# Patient Record
Sex: FEMALE | Race: WHITE | NOT HISPANIC OR LATINO | Employment: OTHER | URBAN - METROPOLITAN AREA
[De-identification: names, ages, dates, MRNs, and addresses within clinical notes are randomized per-mention and may not be internally consistent; named-entity substitution may affect disease eponyms.]

---

## 2017-01-09 ENCOUNTER — HOSPITAL ENCOUNTER (OUTPATIENT)
Dept: PAIN MEDICINE | Facility: HOSPITAL | Age: 42
Discharge: HOME | End: 2017-01-09
Attending: PHYSICAL MEDICINE & REHABILITATION | Admitting: PHYSICAL MEDICINE & REHABILITATION

## 2017-02-13 ENCOUNTER — HOSPITAL ENCOUNTER (OUTPATIENT)
Dept: PAIN MEDICINE | Facility: HOSPITAL | Age: 42
Discharge: HOME | End: 2017-02-13
Attending: PHYSICAL MEDICINE & REHABILITATION | Admitting: PHYSICAL MEDICINE & REHABILITATION

## 2017-04-10 ENCOUNTER — HOSPITAL ENCOUNTER (OUTPATIENT)
Dept: PAIN MEDICINE | Facility: HOSPITAL | Age: 42
Discharge: HOME | End: 2017-04-10
Attending: PHYSICAL MEDICINE & REHABILITATION | Admitting: PHYSICAL MEDICINE & REHABILITATION

## 2017-06-12 ENCOUNTER — HOSPITAL ENCOUNTER (OUTPATIENT)
Dept: PAIN MEDICINE | Facility: HOSPITAL | Age: 42
Discharge: HOME | End: 2017-06-12
Attending: PHYSICAL MEDICINE & REHABILITATION | Admitting: PHYSICAL MEDICINE & REHABILITATION

## 2017-06-12 LAB
AMPHETAMINES UR QL SCN: NEGATIVE
BZE UR QL SCN: NEGATIVE
CREAT 24H UR-MCNC: NORMAL MG/DL
METHADONE UR QL SCN: NEGATIVE
OPIATE CONFIRMATION URINE: NORMAL
THC SERPLBLD CFM-MCNC: NEGATIVE NG/ML

## 2017-08-07 ENCOUNTER — HOSPITAL ENCOUNTER (OUTPATIENT)
Dept: PAIN MEDICINE | Facility: HOSPITAL | Age: 42
Discharge: HOME | End: 2017-08-07
Attending: PHYSICAL MEDICINE & REHABILITATION | Admitting: PHYSICAL MEDICINE & REHABILITATION

## 2017-10-05 ENCOUNTER — HOSPITAL ENCOUNTER (OUTPATIENT)
Dept: PAIN MEDICINE | Facility: HOSPITAL | Age: 42
Discharge: HOME | End: 2017-10-05
Attending: PHYSICAL MEDICINE & REHABILITATION | Admitting: PHYSICAL MEDICINE & REHABILITATION

## 2017-11-28 ENCOUNTER — HOSPITAL ENCOUNTER (OUTPATIENT)
Dept: PAIN MEDICINE | Facility: HOSPITAL | Age: 42
Discharge: HOME | End: 2017-11-28
Attending: PHYSICAL MEDICINE & REHABILITATION | Admitting: PHYSICAL MEDICINE & REHABILITATION

## 2018-01-22 ENCOUNTER — HOSPITAL ENCOUNTER (OUTPATIENT)
Dept: PAIN MEDICINE | Facility: HOSPITAL | Age: 43
Discharge: HOME | End: 2018-01-22
Attending: PHYSICAL MEDICINE & REHABILITATION | Admitting: PHYSICAL MEDICINE & REHABILITATION

## 2018-03-19 ENCOUNTER — HOSPITAL ENCOUNTER (OUTPATIENT)
Dept: PAIN MEDICINE | Facility: HOSPITAL | Age: 43
Discharge: HOME | End: 2018-03-19
Attending: PHYSICAL MEDICINE & REHABILITATION | Admitting: PHYSICAL MEDICINE & REHABILITATION

## 2018-06-05 ENCOUNTER — HOSPITAL ENCOUNTER (OUTPATIENT)
Dept: PAIN MEDICINE | Facility: HOSPITAL | Age: 43
Discharge: HOME | End: 2018-06-05
Attending: PHYSICAL MEDICINE & REHABILITATION | Admitting: PHYSICAL MEDICINE & REHABILITATION

## 2018-08-06 ENCOUNTER — HOSPITAL ENCOUNTER (OUTPATIENT)
Dept: PAIN MEDICINE | Facility: HOSPITAL | Age: 43
Discharge: HOME | End: 2018-08-06
Attending: PHYSICAL MEDICINE & REHABILITATION | Admitting: PHYSICAL MEDICINE & REHABILITATION

## 2018-10-01 ENCOUNTER — HOSPITAL ENCOUNTER (OUTPATIENT)
Dept: PAIN MEDICINE | Facility: HOSPITAL | Age: 43
Discharge: HOME | End: 2018-10-01
Attending: PHYSICAL MEDICINE & REHABILITATION | Admitting: PHYSICAL MEDICINE & REHABILITATION

## 2018-10-01 LAB
AMPHETAMINES UR QL SCN: NEGATIVE
BARBITURATES UR QL SCN: NEGATIVE
BENZODIAZ UR QL SCN: NEGATIVE
BZE UR QL SCN: NEGATIVE
CREAT 24H UR-MCNC: 187.6 MG/DL
METHADONE UR QL SCN: NEGATIVE
OPIATE CONFIRMATION URINE: ABNORMAL
OPIATES TESTED UR SCN: ABNORMAL
PCP UR QL: NEGATIVE
THC SERPLBLD CFM-MCNC: NEGATIVE NG/ML

## 2018-12-04 ENCOUNTER — HOSPITAL ENCOUNTER (OUTPATIENT)
Dept: PAIN MEDICINE | Facility: HOSPITAL | Age: 43
Discharge: HOME | End: 2018-12-04
Attending: PHYSICAL MEDICINE & REHABILITATION | Admitting: PHYSICAL MEDICINE & REHABILITATION

## 2019-02-28 ENCOUNTER — HOSPITAL ENCOUNTER (OUTPATIENT)
Dept: PAIN MEDICINE | Facility: HOSPITAL | Age: 44
Discharge: HOME | End: 2019-02-28
Attending: PHYSICAL MEDICINE & REHABILITATION | Admitting: PHYSICAL MEDICINE & REHABILITATION

## 2019-05-28 ENCOUNTER — CONVERSION ENCOUNTER (OUTPATIENT)
Dept: PAIN MEDICINE | Facility: CLINIC | Age: 44
End: 2019-05-28

## 2019-05-28 ENCOUNTER — HOSPITAL ENCOUNTER (OUTPATIENT)
Dept: PAIN MEDICINE | Facility: HOSPITAL | Age: 44
Discharge: HOME | End: 2019-05-28
Attending: PHYSICAL MEDICINE & REHABILITATION | Admitting: PHYSICAL MEDICINE & REHABILITATION

## 2019-06-04 VITALS
WEIGHT: 202 LBS | SYSTOLIC BLOOD PRESSURE: 121 MMHG | HEART RATE: 89 BPM | DIASTOLIC BLOOD PRESSURE: 82 MMHG | HEIGHT: 62 IN | RESPIRATION RATE: 16 BRPM | OXYGEN SATURATION: 97 % | BODY MASS INDEX: 37.17 KG/M2

## 2019-06-06 NOTE — PROGRESS NOTES
HPI: lower abdominal pain, rectal pain, began  with dz of Ulcerative Colitis, 9/10 at worst, 7/10 at best, 8/10 today, always present, varies with UC, helped only by meds, was taking Tramadol with modest benefit, no NSAIDs with UC, also takes Humira   and other UC meds. X-ray pelvis w/o acute injury. CT head with sinusitis only. Prior notes reviewed, as above, referred for pain management, wants to take minimum pain meds possible. Started Norco 5/325mg QID prn, not effective, increased to 7.5/325mg QID   prn, then 10/325mg QID prn with better relief, generally doing well now except during flares. Had bladder insterstim implanted with improvement in incontinence, no change to pain, stable still. Syncopal episodes, having cardiac cath.    Referring MD: Adela Foster NP  Age: 44 Years Old  Sex: Female    Pain Assessment   Location of Pain: ulcerative colitis pain  Previous Pain Rating : 7  Current Pain Ratin  Last Dose Pain medicine Hydrocodone @ 0300  Have your bowel habits changed since you started taking pain medication? No  Epidural History None      Past Medical History:     Reviewed history from 2018 and no changes required:        Ulcerative colitis        Hyperlipidemia        GERD        difficulty swallowing        Marsh problems    Past Surgical History:     Reviewed history from 2018 and no changes required:        Partial hysterectomy        Colonoscopy        EGD        Cholecystectomy        Pacemaker for bladder Stimulator Dec 21, 2016 Dr. Charles         Right breast biopsy        stress test         Heart Catherization (10/09/2018)    Family History Summary:      Reviewed history Last on 2019 and no changes required:2019  Uncle - Has Family History of Coronary Heart Disease male < 55 -  MI Age 47  - Entered On: 2018  Uncle - Has Family History of Heart Disease -  MI Age 47  - Entered On: 2018  MGF - Has Family History of Coronary Heart  Disease male < 55 -  MI age 52  - Entered On: 2018  MGF - Has Family History of Heart Disease -  MI age 52  - Entered On: 2018      Social History:     Reviewed history from 2018 and no changes required:        Patient currently smokes every day.        Patient has been counseled to quit.        Passive Smoke: Y        Alcohol Use: N        Drug Use: N                        Children~1        Occupation~Disabled        Vital Signs:    Patient Profile:    44 Years Old Female  CC:         Abdominal pain  Height:     62 inches  Weight:     202 pounds  BMI:        36.94     O2 Sat:     97 %  Temp:       98.6 degrees F oral  Pulse rate: 89 / minute  Resp:       16 per minute  BP Sittin / 82    Patient has a risk of falls? No  Patient in pain?    Yes    Problems: Active problems were reviewed with the patient during this visit.  Medications: Medications were reviewed with the patient during this visit.  Allergies: Allergies were reviewed with the patient during this visit.        Vitals Entered By: Izabella Yoon MA (May 28, 2019 7:50 AM)      Risk Factors:     Smoked Tobacco Use:  Current every day smoker     Cigarettes:  Yes -- 1/2 pk pack(s) per day,      Years smoked:  25  Smokeless Tobacco Use:  Never     Counseled to quit/cut down:  yes  Passive smoke exposure:  yes  Drug use:  no  Caffeine use:  2 drinks per day  Alcohol use:  no  Exercise:  no  Seatbelt use:  100 %  Sun Exposure:  frequently    Family History Risk Factors:     Family History of MI in females < 65 years old:  no     Family History of MI in males < 55 years old:  yes    Previous Tobacco Use: Signed On - 2019  Smoked Tobacco Use:  Current every day smoker     Cigarettes:  Yes -- 1/2 pk pack(s) per day,      Years smoked:  25  Smokeless Tobacco Use:  Never     Counseled to quit/cut down:  yes  Passive smoke exposure:  yes  Drug use:  no  Caffeine use:  2 drinks per day        Review of Systems         See HPI    General       Complains of fatigue.       Denies fever and chills.    Eyes       Denies vision loss - both eyes.    ENT       Complains of difficulty swallowing.       Denies decreased hearing.    CV       Denies chest pain or discomfort.    Resp       Complains of shortness of breath.    GI       Complains of nausea, vomiting, abdominal pain, diarrhea and constipation.       Denies stool incontinence.           Complains of inability to control bladder.    MS       Complains of back pain.       Denies joint pain, joint swelling and neck pain.    Derm       Denies rash.    Neuro       Complains of headaches and numbness.       Denies weakness and dizziness.      Physical Exam   General  General appearance: well developed, well nourished, no acute distress  Communication ability: communicates by voice, normal quality  Gait and station: Normal    Mental Status Exam   Mental Status: AAO x3  Thought Content: Intact  Behavior: Appropriate    Head and Face   Inspection: normocephalic, no scars, lesions, or masses  Facial strength: no weakness    Respiratory   Auscultation: clear to auscultation bilaterally, no rales, rhonchi, or wheezes    Cardiovascular   Auscultation: RRR, no murmur, rub, or gallop    Abdomen   Abdomen: soft, non-tender, non-distended, no masses, bowel sounds normal    Extremities   Pedal pulses: pulses 2+, symmetric  Periph. circulation: no cyanosis, clubbing, edema, or varicosities      EXAM:     Neuro   R Leg 1+  L Leg 1+    Strength: Legs Normal  Sensation: SILT BLE      Total Score Risk Category   Low Risk 0 - 3   Moderate Risk 4 - 7   High Risk > 8     Assessment   Status of Existing Problems:  Assessed Abdominal pain as unchanged - Raul Horowitz MD  Assessed Chronic pain syndrome as unchanged - Raul Horowitz MD    Plan   New Prescriptions/Refills:  NORCO  MG ORAL TABLET (HYDROCODONE-ACETAMINOPHEN) Take 1 tab PO q6h prn pain. DX:R10.9. DNF until 8/5/19.  #120 x 0,   05/28/2019, Raul Horowitz MD  NORCO  MG ORAL TABLET (HYDROCODONE-ACETAMINOPHEN) Take 1 tab PO q6h prn pain. DX:R10.9. DNF until 7/6/19.  #120 x 0,  05/28/2019, Raul Horowitz MD  NORCO  MG ORAL TABLET (HYDROCODONE-ACETAMINOPHEN) Take 1 tab PO q6h prn pain. DX:R10.9. DNF until 6/6/19.  #120 x 0,  05/28/2019, Raul Horowitz MD    Updated Medication List:   NORCO  MG ORAL TABLET (HYDROCODONE-ACETAMINOPHEN) Take 1 tab PO q6h prn pain. DX:R10.9. DNF until 8/5/19.  HYDROCODONE-ACETAMINOPHEN  MG ORAL TABLET (HYDROCODONE-ACETAMINOPHEN) Take 1 tab PO q6h prn pain. DX:R10.9. DNF until 5/6/19.  HYDROCODONE-ACETAMINOPHEN  MG ORAL TABLET (HYDROCODONE-ACETAMINOPHEN) Take 1 tab PO q6h prn pain. DX:R10.9. DNF until 4/6/19.  IMITREX STATDOSE REFILL 6 MG/0.5ML SUBCUTANEOUS SOLUTION CARTRIDGE (SUMATRIPTAN SUCCINATE) Inject SQ with onset of mirgraine  RANEXA  MG TABLET (RANOLAZINE) TAKE 1 TABLET BY MOUTH TWICE A DAY  MELOXICAM 15 MG ORAL TABLET (MELOXICAM) p.o. qd  LYRICA 25 MG ORAL CAPSULE (PREGABALIN) BID  OMEPRAZOLE 40 MG ORAL CAPSULE DELAYED RELEASE (OMEPRAZOLE) 2 times a day  RIZATRIPTAN BENZOATE 10 MG ORAL TABLET (RIZATRIPTAN BENZOATE) 1 tablet with onset of migraine, repeat at 2 hours  TOPAMAX TABLET (TOPIRAMATE TABS) 100 mg daily  METOPROLOL SUCCINATE ER 25 MG ORAL TABLET EXTENDED RELEASE 24 HOUR (METOPROLOL SUCCINATE) once a day  VENTOLIN HFA AEROSOL SOLUTION (ALBUTEROL SULFATE AERS) 90mcg per actuation, 2 puffs daily  ASPIRIN EC LOW DOSE 81 MG ORAL TABLET DELAYED RELEASE (ASPIRIN) QD  ZOLPIDEM TARTRATE 10 MG ORAL TABLET (ZOLPIDEM TARTRATE) Daily  VENLAFAXINE HCL TABLET (VENLAFAXINE HCL TABS) 225 mg daily  PROMETHAZINE HCL 25 MG ORAL TABLET (PROMETHAZINE HCL) Take 1 tablet by mouth daily as needed  PROBIOTIC DAILY ORAL CAPSULE (PROBIOTIC PRODUCT) Daily  LORATADINE 10 MG ORAL TABLET (LORATADINE) Daily  KLOR-CON M20 TABLET EXTENDED RELEASE (POTASSIUM CHLORIDE RAMANA CR CR-TABS)  Daily  HUMIRA PEN 40 MG/0.8ML SUBCUTANEOUS PEN-INJECTOR KIT (ADALIMUMAB) 2x monthly  FAMOTIDINE 20 MG ORAL TABLET (FAMOTIDINE) BID  DELZICOL 400 MG ORAL CAPSULE DELAYED RELEASE (MESALAMINE) 2 tabs TID  COLESTID 1 GM ORAL TABLET (COLESTIPOL HCL) 1-2 times daily  ATORVASTATIN CALCIUM 10 MG ORAL TABLET (ATORVASTATIN CALCIUM) Daily  NASACORT ALLERGY 24HR 55 MCG/ACT NASAL AEROSOL (TRIAMCINOLONE ACETONIDE) 2 sprays every morning  * MAGIC BUTTOCKS CREAM BID    New Orders:   Ofc Vst, Est Level III [[id number]]        Patient Instructions:  1)  INspect reviewed, in order. Low risk. UDS 10/1/18 in order.  2)  Failed Norco 5/325mg QID prn, 7.5/325mg QID prn.  3)  Cont Norco 10/325mg QID prn, no plans to increase for now, discussed need to balance pain control with adverse side effects of opioids previously. Using short-acting opioids as pain varies considerably. Was taking brand name only following severe   allergic reaction after pharmacy changed generics, but [facility] not carrying generic to which she is not allergic.  4)  Cont other meds as prescribed.  5)  RTC 3 months for f/u.    ]      Electronically signed by Raul Horowitz MD on 05/28/2019 at 8:18 AM  ________________________________________________________________________       Disclaimer: Converted Note message may not contain all data elements that existed in the legacy source system. Please see [employer] [facility] for the original note details.

## 2019-09-03 ENCOUNTER — OFFICE VISIT (OUTPATIENT)
Dept: PAIN MEDICINE | Facility: CLINIC | Age: 44
End: 2019-09-03

## 2019-09-03 VITALS
TEMPERATURE: 98.3 F | BODY MASS INDEX: 36.99 KG/M2 | SYSTOLIC BLOOD PRESSURE: 118 MMHG | WEIGHT: 201 LBS | RESPIRATION RATE: 16 BRPM | DIASTOLIC BLOOD PRESSURE: 81 MMHG | HEART RATE: 75 BPM | HEIGHT: 62 IN

## 2019-09-03 DIAGNOSIS — Z79.899 OTHER LONG TERM (CURRENT) DRUG THERAPY: ICD-10-CM

## 2019-09-03 DIAGNOSIS — F19.90 CURRENT DRUG USE: Primary | ICD-10-CM

## 2019-09-03 DIAGNOSIS — G89.4 CHRONIC PAIN DISORDER: Primary | ICD-10-CM

## 2019-09-03 DIAGNOSIS — R10.9 ABDOMINAL PAIN, UNSPECIFIED ABDOMINAL LOCATION: ICD-10-CM

## 2019-09-03 PROCEDURE — G0463 HOSPITAL OUTPT CLINIC VISIT: HCPCS | Performed by: PHYSICAL MEDICINE & REHABILITATION

## 2019-09-03 PROCEDURE — 99213 OFFICE O/P EST LOW 20 MIN: CPT | Performed by: PHYSICAL MEDICINE & REHABILITATION

## 2019-09-03 RX ORDER — HYDROCODONE BITARTRATE AND ACETAMINOPHEN 10; 325 MG/1; MG/1
1 TABLET ORAL EVERY 6 HOURS PRN
Qty: 120 TABLET | Refills: 0 | Status: SHIPPED | OUTPATIENT
Start: 2019-09-03 | End: 2019-09-03 | Stop reason: SDUPTHER

## 2019-09-03 RX ORDER — ZOLPIDEM TARTRATE 10 MG/1
10 TABLET ORAL
Refills: 0 | COMMUNITY
Start: 2019-07-30

## 2019-09-03 RX ORDER — HYDROCODONE BITARTRATE AND ACETAMINOPHEN 10; 325 MG/1; MG/1
1 TABLET ORAL EVERY 6 HOURS PRN
Qty: 120 TABLET | Refills: 0 | Status: SHIPPED | OUTPATIENT
Start: 2019-09-03 | End: 2019-12-03 | Stop reason: SDUPTHER

## 2019-09-03 RX ORDER — LORATADINE 10 MG/1
TABLET ORAL
COMMUNITY
Start: 2016-08-16

## 2019-09-03 RX ORDER — HYDROCODONE BITARTRATE AND ACETAMINOPHEN 10; 325 MG/1; MG/1
1 TABLET ORAL EVERY 6 HOURS PRN
Refills: 0 | COMMUNITY
Start: 2019-08-05 | End: 2019-09-03 | Stop reason: SDUPTHER

## 2019-09-03 RX ORDER — MONTELUKAST SODIUM 4 MG/1
TABLET, CHEWABLE ORAL
COMMUNITY
Start: 2016-08-16 | End: 2021-11-19

## 2019-09-03 RX ORDER — PROMETHAZINE HYDROCHLORIDE 25 MG/1
25 TABLET ORAL EVERY 4 HOURS PRN
Refills: 2 | COMMUNITY
Start: 2019-08-14

## 2019-09-03 RX ORDER — MELOXICAM 15 MG/1
TABLET ORAL EVERY 24 HOURS
COMMUNITY
Start: 2018-09-13

## 2019-09-03 RX ORDER — METOPROLOL SUCCINATE 25 MG/1
25 TABLET, EXTENDED RELEASE ORAL DAILY
Refills: 3 | COMMUNITY
Start: 2019-06-23

## 2019-09-03 RX ORDER — MESALAMINE 400 MG/1
800 CAPSULE, DELAYED RELEASE ORAL 3 TIMES DAILY
Refills: 5 | COMMUNITY
Start: 2019-08-10 | End: 2021-11-19

## 2019-09-03 RX ORDER — ATORVASTATIN CALCIUM 10 MG/1
10 TABLET, FILM COATED ORAL DAILY
Refills: 3 | COMMUNITY
Start: 2019-06-06

## 2019-09-03 RX ORDER — OMEPRAZOLE 40 MG/1
40 CAPSULE, DELAYED RELEASE ORAL 2 TIMES DAILY
Refills: 3 | COMMUNITY
Start: 2019-08-10

## 2019-09-03 RX ORDER — RANOLAZINE 500 MG/1
TABLET, EXTENDED RELEASE ORAL EVERY 12 HOURS
COMMUNITY
Start: 2018-10-03 | End: 2021-08-27

## 2019-09-03 RX ORDER — RIZATRIPTAN BENZOATE 10 MG/1
TABLET ORAL
COMMUNITY
Start: 2017-06-12 | End: 2021-08-27

## 2019-09-03 RX ORDER — ZINC OXIDE 13 %
CREAM (GRAM) TOPICAL
COMMUNITY
Start: 2016-08-16

## 2019-09-03 RX ORDER — VENLAFAXINE HYDROCHLORIDE 225 MG/1
225 TABLET, EXTENDED RELEASE ORAL DAILY
Refills: 3 | COMMUNITY
Start: 2019-06-13 | End: 2021-03-04 | Stop reason: ALTCHOICE

## 2019-09-03 RX ORDER — NALOXONE HYDROCHLORIDE 4 MG/.1ML
SPRAY NASAL
Refills: 0 | COMMUNITY
Start: 2019-08-07

## 2019-09-03 RX ORDER — POTASSIUM CHLORIDE 1.5 G/1.77G
POWDER, FOR SOLUTION ORAL
COMMUNITY
Start: 2016-08-16 | End: 2021-11-19

## 2019-09-03 RX ORDER — SULFAMETHOXAZOLE AND TRIMETHOPRIM 800; 160 MG/1; MG/1
1 TABLET ORAL EVERY 12 HOURS
Refills: 0 | COMMUNITY
Start: 2019-08-27 | End: 2021-08-27

## 2019-09-03 RX ORDER — PREGABALIN 25 MG/1
25 CAPSULE ORAL 2 TIMES DAILY
Refills: 1 | COMMUNITY
Start: 2019-08-06

## 2019-09-03 RX ORDER — TOPIRAMATE 100 MG/1
100 TABLET, FILM COATED ORAL
Refills: 3 | COMMUNITY
Start: 2019-08-14

## 2019-09-03 RX ORDER — ALBUTEROL SULFATE 90 UG/1
AEROSOL, METERED RESPIRATORY (INHALATION)
Refills: 3 | COMMUNITY
Start: 2019-08-21

## 2019-09-03 RX ORDER — ASPIRIN 81 MG/1
TABLET ORAL
COMMUNITY
Start: 2016-09-19 | End: 2021-08-27

## 2019-09-03 RX ORDER — FAMOTIDINE 20 MG/1
TABLET, FILM COATED ORAL
COMMUNITY
Start: 2016-08-16 | End: 2021-11-19

## 2019-09-03 NOTE — PROGRESS NOTES
Jacy Klein is a 44 y.o. female.     lower abdominal pain, rectal pain, began 2012 with dz of Ulcerative Colitis, 9/10 at worst, 7/10 at best, 8/10 today, always present, varies with UC, helped only by meds, was taking Tramadol with modest benefit, no NSAIDs with UC, also takes Humira and other UC meds. X-ray pelvis w/o acute injury. CT head with sinusitis only. Prior notes reviewed, as above, referred for pain management, wants to take minimum pain meds possible. Started Norco 5/325mg QID prn, not effective, increased to 7.5/325mg QID prn, then 10/325mg QID prn with better relief, generally doing well now except during flares. Had bladder insterstim implanted with improvement in incontinence, no change to pain, stable still. Syncopal episodes, having cardiac cath.         The following portions of the patient's history were reviewed and updated as appropriate: allergies, current medications, past family history, past medical history, past social history, past surgical history and problem list.    Review of Systems   Constitutional: Positive for fatigue. Negative for chills and fever.   HENT: Positive for trouble swallowing. Negative for hearing loss.    Eyes: Negative for visual disturbance.   Respiratory: Positive for shortness of breath.    Cardiovascular: Negative for chest pain.   Gastrointestinal: Positive for abdominal pain, constipation, diarrhea, nausea and vomiting.   Genitourinary: Negative for urinary incontinence.   Musculoskeletal: Positive for back pain. Negative for arthralgias, joint swelling, myalgias and neck pain.   Neurological: Positive for numbness and headache. Negative for dizziness and weakness.       Objective   Physical Exam   Constitutional: She is oriented to person, place, and time. She appears well-developed and well-nourished.   HENT:   Head: Normocephalic and atraumatic.   Eyes: EOM are normal. Pupils are equal, round, and reactive to light.   Neck: Normal range of  motion.   Cardiovascular: Normal rate, regular rhythm, normal heart sounds and intact distal pulses.   Pulmonary/Chest: Breath sounds normal.   Abdominal: Soft. Bowel sounds are normal. She exhibits no distension. There is no tenderness.   Neurological: She is alert and oriented to person, place, and time. She has normal strength and normal reflexes. She displays normal reflexes. No sensory deficit.   Psychiatric: She has a normal mood and affect. Her behavior is normal. Thought content normal.         Assessment/Plan   Megan was seen today for abdominal pain.    Diagnoses and all orders for this visit:    Chronic pain disorder    Abdominal pain, unspecified abdominal location    Other long term (current) drug therapy        INspect reviewed, in order. Low risk. UDS 10/1/18 in order.  Failed Norco 5/325mg QID prn, 7.5/325mg QID prn.  Cont Norco 10/325mg QID prn, no plans to increase for now, discussed need to balance pain control with adverse side effects of opioids previously. Using short-acting opioids as pain varies considerably. Was taking brand name only following severe allergic reaction after pharmacy changed generics, but [facility] not carrying generic to which she is not allergic.  Cont other meds as prescribed.  RTC 3 months for f/u.

## 2019-11-04 ENCOUNTER — TELEPHONE (OUTPATIENT)
Dept: PAIN MEDICINE | Facility: CLINIC | Age: 44
End: 2019-11-04

## 2019-11-04 NOTE — TELEPHONE ENCOUNTER
Patient called to give you an FYI her ortho and PCP told her to make you aware that her pain is severe and Ortho is sending her for a CT lumbar myelogram.

## 2019-11-08 ENCOUNTER — HOSPITAL ENCOUNTER (OUTPATIENT)
Dept: GENERAL RADIOLOGY | Facility: HOSPITAL | Age: 44
Discharge: HOME | End: 2019-11-08
Admitting: RADIOLOGY

## 2019-11-08 ENCOUNTER — HOSPITAL ENCOUNTER (OUTPATIENT)
Dept: CT IMAGING | Facility: HOSPITAL | Age: 44
Discharge: HOME | End: 2019-11-08

## 2019-11-08 VITALS
RESPIRATION RATE: 14 BRPM | TEMPERATURE: 97.8 F | HEART RATE: 81 BPM | SYSTOLIC BLOOD PRESSURE: 99 MMHG | WEIGHT: 204.59 LBS | DIASTOLIC BLOOD PRESSURE: 46 MMHG | HEIGHT: 64 IN | BODY MASS INDEX: 34.93 KG/M2 | OXYGEN SATURATION: 96 %

## 2019-11-08 DIAGNOSIS — M54.16 LUMBAR RADICULOPATHY: ICD-10-CM

## 2019-11-08 LAB
APTT PPP: 27.2 SECONDS (ref 24–31)
BASOPHILS # BLD AUTO: 0.1 10*3/MM3 (ref 0–0.2)
BASOPHILS NFR BLD AUTO: 0.6 % (ref 0–1.5)
DEPRECATED RDW RBC AUTO: 45.9 FL (ref 37–54)
EOSINOPHIL # BLD AUTO: 0.3 10*3/MM3 (ref 0–0.4)
EOSINOPHIL NFR BLD AUTO: 2.2 % (ref 0.3–6.2)
ERYTHROCYTE [DISTWIDTH] IN BLOOD BY AUTOMATED COUNT: 14 % (ref 12.3–15.4)
HCT VFR BLD AUTO: 38.4 % (ref 34–46.6)
HGB BLD-MCNC: 13.3 G/DL (ref 12–15.9)
INR PPP: 0.95 (ref 0.9–1.1)
LYMPHOCYTES # BLD AUTO: 4.2 10*3/MM3 (ref 0.7–3.1)
LYMPHOCYTES NFR BLD AUTO: 33.6 % (ref 19.6–45.3)
MCH RBC QN AUTO: 31.8 PG (ref 26.6–33)
MCHC RBC AUTO-ENTMCNC: 34.8 G/DL (ref 31.5–35.7)
MCV RBC AUTO: 91.6 FL (ref 79–97)
MONOCYTES # BLD AUTO: 0.8 10*3/MM3 (ref 0.1–0.9)
MONOCYTES NFR BLD AUTO: 6.3 % (ref 5–12)
NEUTROPHILS # BLD AUTO: 7.2 10*3/MM3 (ref 1.7–7)
NEUTROPHILS NFR BLD AUTO: 57.3 % (ref 42.7–76)
NRBC BLD AUTO-RTO: 0.1 /100 WBC (ref 0–0.2)
PLATELET # BLD AUTO: 228 10*3/MM3 (ref 140–450)
PMV BLD AUTO: 7.7 FL (ref 6–12)
PROTHROMBIN TIME: 10.1 SECONDS (ref 9.6–11.7)
RBC # BLD AUTO: 4.19 10*6/MM3 (ref 3.77–5.28)
WBC NRBC COR # BLD: 12.6 10*3/MM3 (ref 3.4–10.8)

## 2019-11-08 PROCEDURE — 72240 MYELOGRAPHY NECK SPINE: CPT

## 2019-11-08 PROCEDURE — 85025 COMPLETE CBC W/AUTO DIFF WBC: CPT | Performed by: RADIOLOGY

## 2019-11-08 PROCEDURE — 0 IOPAMIDOL 41 % SOLUTION: Performed by: ORTHOPAEDIC SURGERY

## 2019-11-08 PROCEDURE — 62304 MYELOGRAPHY LUMBAR INJECTION: CPT

## 2019-11-08 PROCEDURE — 72132 CT LUMBAR SPINE W/DYE: CPT

## 2019-11-08 PROCEDURE — 85610 PROTHROMBIN TIME: CPT | Performed by: RADIOLOGY

## 2019-11-08 PROCEDURE — 85730 THROMBOPLASTIN TIME PARTIAL: CPT | Performed by: RADIOLOGY

## 2019-11-08 RX ORDER — SODIUM CHLORIDE 0.9 % (FLUSH) 0.9 %
3 SYRINGE (ML) INJECTION EVERY 12 HOURS SCHEDULED
Status: DISCONTINUED | OUTPATIENT
Start: 2019-11-08 | End: 2019-11-08 | Stop reason: HOSPADM

## 2019-11-08 NOTE — DISCHARGE INSTRUCTIONS
A responsible adult should stay with you and you should rest quietly for the rest of the day. Do not drink alcohol, drive or cook for 24 hours following your procedure.  Progress your diet as tolerated.  Increase your fluid intake over the next 24 hours.  Resume your usually medications including aspirin and mobic except venalfaxine and phenergan.  You may resume those tomorrow.  When you remove your dressing in 24 hours, a small amount of blood is to be expected. Do not be alarmed.  If you feel it is bleeding excessively apply pressure and proceed to the Emergency room.  Do not shower, bath, or get your dressing wet at all for 24 hours.  You may shower after the dressing is removed. No lifting more that 10 pounds for 24 hours.  Keep your head elevated at least 30 degrees for 24 hours. If severe pain, headache not relieved by pain medication, increased shortness of air or racing heartbeat occur, seek immediate medical attention.  Follow up with Dr. Eaton for results.

## 2019-11-14 ENCOUNTER — HOSPITAL ENCOUNTER (OUTPATIENT)
Dept: GENERAL RADIOLOGY | Facility: HOSPITAL | Age: 44
Discharge: HOME | End: 2019-11-14
Admitting: NEUROLOGICAL SURGERY

## 2019-11-14 ENCOUNTER — TRANSCRIBE ORDERS (OUTPATIENT)
Dept: ADMINISTRATIVE | Facility: HOSPITAL | Age: 44
End: 2019-11-14

## 2019-11-14 DIAGNOSIS — R52 PAIN: Primary | ICD-10-CM

## 2019-11-14 DIAGNOSIS — R52 PAIN: ICD-10-CM

## 2019-11-14 PROCEDURE — 72114 X-RAY EXAM L-S SPINE BENDING: CPT

## 2019-11-20 PROCEDURE — 0 IOPAMIDOL 41 % SOLUTION: Performed by: ORTHOPAEDIC SURGERY

## 2019-11-20 RX ADMIN — IOPAMIDOL 20 ML: 408 INJECTION, SOLUTION INTRATHECAL at 14:45

## 2019-12-03 ENCOUNTER — OFFICE VISIT (OUTPATIENT)
Dept: PAIN MEDICINE | Facility: CLINIC | Age: 44
End: 2019-12-03

## 2019-12-03 VITALS
RESPIRATION RATE: 16 BRPM | SYSTOLIC BLOOD PRESSURE: 119 MMHG | HEART RATE: 82 BPM | TEMPERATURE: 98.1 F | HEIGHT: 64 IN | DIASTOLIC BLOOD PRESSURE: 64 MMHG | WEIGHT: 207 LBS | OXYGEN SATURATION: 96 % | BODY MASS INDEX: 35.34 KG/M2

## 2019-12-03 DIAGNOSIS — R10.9 ABDOMINAL PAIN, UNSPECIFIED ABDOMINAL LOCATION: ICD-10-CM

## 2019-12-03 DIAGNOSIS — Z79.899 OTHER LONG TERM (CURRENT) DRUG THERAPY: ICD-10-CM

## 2019-12-03 DIAGNOSIS — G89.4 CHRONIC PAIN DISORDER: Primary | ICD-10-CM

## 2019-12-03 PROCEDURE — 99214 OFFICE O/P EST MOD 30 MIN: CPT | Performed by: PHYSICAL MEDICINE & REHABILITATION

## 2019-12-03 PROCEDURE — G0463 HOSPITAL OUTPT CLINIC VISIT: HCPCS | Performed by: PHYSICAL MEDICINE & REHABILITATION

## 2019-12-03 RX ORDER — ARIPIPRAZOLE 5 MG/1
TABLET ORAL
COMMUNITY
End: 2021-08-27

## 2019-12-03 RX ORDER — HYDROCODONE BITARTRATE AND ACETAMINOPHEN 10; 325 MG/1; MG/1
1 TABLET ORAL EVERY 4 HOURS PRN
Qty: 180 TABLET | Refills: 0 | Status: SHIPPED | OUTPATIENT
Start: 2019-12-03 | End: 2019-12-03 | Stop reason: SDUPTHER

## 2019-12-03 RX ORDER — HYDROCODONE BITARTRATE AND ACETAMINOPHEN 10; 325 MG/1; MG/1
1 TABLET ORAL EVERY 4 HOURS PRN
Qty: 180 TABLET | Refills: 0 | Status: SHIPPED | OUTPATIENT
Start: 2019-12-03 | End: 2020-02-28 | Stop reason: SDUPTHER

## 2019-12-03 NOTE — PROGRESS NOTES
Jacy Klein is a 44 y.o. female.     lower abdominal pain, rectal pain, began 2012 with dz of Ulcerative Colitis, 9/10 at worst, 7/10 at best, 8/10 today, always present, varies with UC, helped only by meds, was taking Tramadol with modest benefit, no NSAIDs with UC, also takes Humira and other UC meds. X-ray pelvis w/o acute injury. CT head with sinusitis only. Prior notes reviewed, as above, referred for pain management, wants to take minimum pain meds possible. Started Norco 5/325mg QID prn, not effective, increased to 7.5/325mg QID prn, then 10/325mg QID prn with better relief, generally doing well now except during flares. Had bladder insterstim implanted with improvement in incontinence, no change to pain, stable still. Syncopal episodes, had cardiac cath. Worsening LBP, new MRI L-spine with L5/S1 listhesis 7mm, needs fusion with Dr. Umaña.         The following portions of the patient's history were reviewed and updated as appropriate: allergies, current medications, past family history, past medical history, past social history, past surgical history and problem list.    Review of Systems   Constitutional: Positive for fatigue. Negative for chills and fever.   HENT: Positive for trouble swallowing. Negative for hearing loss.    Eyes: Negative for visual disturbance.   Respiratory: Positive for shortness of breath.    Cardiovascular: Negative for chest pain.   Gastrointestinal: Positive for abdominal pain, constipation, diarrhea, nausea and vomiting.   Genitourinary: Negative for urinary incontinence.   Musculoskeletal: Positive for back pain. Negative for arthralgias, joint swelling, myalgias and neck pain.   Neurological: Positive for numbness and headache. Negative for dizziness and weakness.       Objective   Physical Exam   Constitutional: She is oriented to person, place, and time. She appears well-developed and well-nourished.   HENT:   Head: Normocephalic and atraumatic.   Eyes: EOM  are normal. Pupils are equal, round, and reactive to light.   Neck: Normal range of motion.   Cardiovascular: Normal rate, regular rhythm, normal heart sounds and intact distal pulses.   Pulmonary/Chest: Breath sounds normal.   Abdominal: Soft. Bowel sounds are normal. She exhibits no distension. There is no tenderness.   Neurological: She is alert and oriented to person, place, and time. She has normal strength and normal reflexes. She displays normal reflexes. No sensory deficit.   Psychiatric: She has a normal mood and affect. Her behavior is normal. Thought content normal.         Assessment/Plan   Megan was seen today for back pain.    Diagnoses and all orders for this visit:    Chronic pain disorder    Other long term (current) drug therapy    Abdominal pain, unspecified abdominal location        INspect reviewed, in order. Low risk. UDS 9/3/19 in order.  Failed Norco 5/325mg QID prn, 7.5/325mg QID prn.  Increase to Norco 10/325mg q4h prn, hopefully reduce after surgery, discussed need to balance pain control with adverse side effects of opioids previously. Using short-acting opioids as pain varies considerably. Was taking brand name only following severe allergic reaction after pharmacy changed generics, but [facility] not carrying generic to which she is not allergic.  Cont other meds as prescribed.  MRI L-spine reviewed with patient.  RTC 3 months for f/u.

## 2020-02-28 ENCOUNTER — OFFICE VISIT (OUTPATIENT)
Dept: PAIN MEDICINE | Facility: CLINIC | Age: 45
End: 2020-02-28

## 2020-02-28 VITALS
HEIGHT: 64 IN | WEIGHT: 209 LBS | SYSTOLIC BLOOD PRESSURE: 108 MMHG | HEART RATE: 90 BPM | OXYGEN SATURATION: 98 % | DIASTOLIC BLOOD PRESSURE: 76 MMHG | TEMPERATURE: 98.3 F | BODY MASS INDEX: 35.68 KG/M2 | RESPIRATION RATE: 16 BRPM

## 2020-02-28 DIAGNOSIS — Z79.899 OTHER LONG TERM (CURRENT) DRUG THERAPY: ICD-10-CM

## 2020-02-28 DIAGNOSIS — K62.89 RECTAL PAIN: Primary | ICD-10-CM

## 2020-02-28 DIAGNOSIS — G89.29 CHRONIC BILATERAL LOW BACK PAIN WITHOUT SCIATICA: ICD-10-CM

## 2020-02-28 DIAGNOSIS — M54.50 CHRONIC BILATERAL LOW BACK PAIN WITHOUT SCIATICA: ICD-10-CM

## 2020-02-28 DIAGNOSIS — G89.4 CHRONIC PAIN DISORDER: ICD-10-CM

## 2020-02-28 DIAGNOSIS — R10.9 ABDOMINAL PAIN, UNSPECIFIED ABDOMINAL LOCATION: ICD-10-CM

## 2020-02-28 PROBLEM — M54.9 CHRONIC BACK PAIN: Status: ACTIVE | Noted: 2020-02-28

## 2020-02-28 PROCEDURE — 99214 OFFICE O/P EST MOD 30 MIN: CPT | Performed by: PHYSICAL MEDICINE & REHABILITATION

## 2020-02-28 PROCEDURE — G0463 HOSPITAL OUTPT CLINIC VISIT: HCPCS | Performed by: PHYSICAL MEDICINE & REHABILITATION

## 2020-02-28 RX ORDER — HYDROCODONE BITARTRATE AND ACETAMINOPHEN 10; 325 MG/1; MG/1
1 TABLET ORAL EVERY 4 HOURS PRN
Qty: 180 TABLET | Refills: 0 | Status: SHIPPED | OUTPATIENT
Start: 2020-02-28 | End: 2020-02-28 | Stop reason: SDUPTHER

## 2020-02-28 RX ORDER — TIZANIDINE 4 MG/1
4 TABLET ORAL EVERY 8 HOURS PRN
Qty: 90 TABLET | Refills: 2 | Status: SHIPPED | OUTPATIENT
Start: 2020-02-28 | End: 2020-06-11 | Stop reason: SDUPTHER

## 2020-02-28 RX ORDER — HYDROCODONE BITARTRATE AND ACETAMINOPHEN 10; 325 MG/1; MG/1
1 TABLET ORAL EVERY 4 HOURS PRN
Qty: 180 TABLET | Refills: 0 | Status: SHIPPED | OUTPATIENT
Start: 2020-02-28 | End: 2020-06-11 | Stop reason: SDUPTHER

## 2020-02-28 RX ORDER — DIAZEPAM 5 MG/1
TABLET ORAL
COMMUNITY
Start: 2020-01-30 | End: 2021-08-27

## 2020-02-28 RX ORDER — EPINEPHRINE 0.3 MG/.3ML
INJECTION SUBCUTANEOUS
COMMUNITY

## 2020-02-28 RX ORDER — BACLOFEN 5 MG/1
TABLET ORAL EVERY 8 HOURS SCHEDULED
COMMUNITY
End: 2021-08-27

## 2020-02-28 NOTE — PROGRESS NOTES
Jacy Klein is a 44 y.o. female.     lower abdominal pain, rectal pain, began 2012 with dz of Ulcerative Colitis, 9/10 at worst, 7/10 at best, 8/10 today, always present, varies with UC, helped only by meds, was taking Tramadol with modest benefit, no NSAIDs with UC, also takes Humira and other UC meds. X-ray pelvis w/o acute injury. CT head with sinusitis only. Prior notes reviewed, as above, referred for pain management, wants to take minimum pain meds possible. Started Norco 5/325mg QID prn, not effective, increased to 7.5/325mg QID prn, then 10/325mg QID prn with better relief, generally doing well now except during flares. Had bladder insterstim implanted with improvement in incontinence, no change to pain, stable still. Syncopal episodes, had cardiac cath. Worsening LBP, new MRI L-spine with L5/S1 listhesis 7mm, had fusion with Dr. Umaña with post-op pain, muscle spasms.       The following portions of the patient's history were reviewed and updated as appropriate: allergies, current medications, past family history, past medical history, past social history, past surgical history and problem list.    Review of Systems   Constitutional: Positive for fatigue. Negative for chills and fever.   HENT: Positive for trouble swallowing. Negative for hearing loss.    Eyes: Negative for visual disturbance.   Respiratory: Positive for shortness of breath.    Cardiovascular: Negative for chest pain.   Gastrointestinal: Positive for abdominal pain, constipation, diarrhea, nausea and vomiting.   Genitourinary: Negative for urinary incontinence.   Musculoskeletal: Positive for back pain. Negative for arthralgias, joint swelling, myalgias and neck pain.   Neurological: Positive for numbness and headache. Negative for dizziness and weakness.       Objective   Physical Exam   Constitutional: She is oriented to person, place, and time. She appears well-developed and well-nourished.   HENT:   Head: Normocephalic  and atraumatic.   Eyes: Pupils are equal, round, and reactive to light. EOM are normal.   Neck: Normal range of motion.   Cardiovascular: Normal rate, regular rhythm, normal heart sounds and intact distal pulses.   Pulmonary/Chest: Breath sounds normal.   Abdominal: Soft. Bowel sounds are normal. She exhibits no distension. There is no tenderness.   Neurological: She is alert and oriented to person, place, and time. She has normal strength and normal reflexes. She displays normal reflexes. No sensory deficit.   Psychiatric: She has a normal mood and affect. Her behavior is normal. Thought content normal.         Assessment/Plan   Megan was seen today for pain and back pain.    Diagnoses and all orders for this visit:    Rectal pain    Abdominal pain, unspecified abdominal location    Chronic pain disorder    Other long term (current) drug therapy    Chronic bilateral low back pain without sciatica        INspect reviewed, in order, small amount of oxycodone from surgeon. Low risk. UDS 9/3/19 in order.  Failed Norco 5/325mg QID prn, 7.5/325mg QID prn.  Increased to Norco 10/325mg q4h prn, hopefully reduce after surgery, discussed need to balance pain control with adverse side effects of opioids previously. Using short-acting opioids as pain varies considerably. Was taking brand name only following severe allergic reaction after pharmacy changed generics, but [facility] not carrying generic to which she is not allergic.  Cont other meds as prescribed.  Begin Tizanidine 4mg TID prn, failed Baclofen, cannot tolerate Flexeril.  RTC 3 months for f/u.

## 2020-06-11 ENCOUNTER — OFFICE VISIT (OUTPATIENT)
Dept: PAIN MEDICINE | Facility: CLINIC | Age: 45
End: 2020-06-11

## 2020-06-11 VITALS
OXYGEN SATURATION: 100 % | HEIGHT: 64 IN | TEMPERATURE: 97.1 F | WEIGHT: 210 LBS | BODY MASS INDEX: 35.85 KG/M2 | SYSTOLIC BLOOD PRESSURE: 119 MMHG | DIASTOLIC BLOOD PRESSURE: 82 MMHG | RESPIRATION RATE: 16 BRPM | HEART RATE: 75 BPM

## 2020-06-11 DIAGNOSIS — R10.9 ABDOMINAL PAIN, UNSPECIFIED ABDOMINAL LOCATION: ICD-10-CM

## 2020-06-11 DIAGNOSIS — K62.89 RECTAL PAIN: Primary | ICD-10-CM

## 2020-06-11 DIAGNOSIS — G89.29 CHRONIC BILATERAL LOW BACK PAIN WITHOUT SCIATICA: ICD-10-CM

## 2020-06-11 DIAGNOSIS — G89.4 CHRONIC PAIN DISORDER: ICD-10-CM

## 2020-06-11 DIAGNOSIS — M54.50 CHRONIC BILATERAL LOW BACK PAIN WITHOUT SCIATICA: ICD-10-CM

## 2020-06-11 DIAGNOSIS — M54.2 NECK PAIN: ICD-10-CM

## 2020-06-11 DIAGNOSIS — Z79.899 OTHER LONG TERM (CURRENT) DRUG THERAPY: ICD-10-CM

## 2020-06-11 PROCEDURE — G0463 HOSPITAL OUTPT CLINIC VISIT: HCPCS | Performed by: PHYSICAL MEDICINE & REHABILITATION

## 2020-06-11 PROCEDURE — 99213 OFFICE O/P EST LOW 20 MIN: CPT | Performed by: PHYSICAL MEDICINE & REHABILITATION

## 2020-06-11 RX ORDER — BUSPIRONE HYDROCHLORIDE 10 MG/1
TABLET ORAL
COMMUNITY

## 2020-06-11 RX ORDER — HYDROCODONE BITARTRATE AND ACETAMINOPHEN 10; 325 MG/1; MG/1
1 TABLET ORAL EVERY 4 HOURS PRN
Qty: 180 TABLET | Refills: 0 | Status: SHIPPED | OUTPATIENT
Start: 2020-06-11 | End: 2020-06-11 | Stop reason: SDUPTHER

## 2020-06-11 RX ORDER — INHALER, ASSIST DEVICES
SPACER (EA) MISCELLANEOUS SEE ADMIN INSTRUCTIONS
COMMUNITY
Start: 2020-03-30 | End: 2021-08-27

## 2020-06-11 RX ORDER — CYCLOSPORINE 0.5 MG/ML
EMULSION OPHTHALMIC
COMMUNITY
Start: 2020-03-15

## 2020-06-11 RX ORDER — DULOXETIN HYDROCHLORIDE 20 MG/1
20 CAPSULE, DELAYED RELEASE ORAL DAILY
COMMUNITY
Start: 2020-05-27 | End: 2021-03-04 | Stop reason: ALTCHOICE

## 2020-06-11 RX ORDER — HYDROCODONE BITARTRATE AND ACETAMINOPHEN 10; 325 MG/1; MG/1
1 TABLET ORAL EVERY 4 HOURS PRN
Qty: 180 TABLET | Refills: 0 | Status: SHIPPED | OUTPATIENT
Start: 2020-06-11 | End: 2020-09-10 | Stop reason: SDUPTHER

## 2020-06-11 RX ORDER — TIZANIDINE 4 MG/1
4 TABLET ORAL EVERY 8 HOURS PRN
Qty: 90 TABLET | Refills: 11 | Status: SHIPPED | OUTPATIENT
Start: 2020-06-11 | End: 2021-06-02

## 2020-06-11 NOTE — PROGRESS NOTES
Jacy Klein is a 45 y.o. female.     lower abdominal pain, rectal pain, began 2012 with dz of Ulcerative Colitis, 9/10 at worst, 7/10 at best, 8/10 today, always present, varies with UC, helped only by meds, was taking Tramadol with modest benefit, no NSAIDs with UC, also takes Humira and other UC meds. X-ray pelvis w/o acute injury. CT head with sinusitis only. Prior notes reviewed, as above, referred for pain management, wants to take minimum pain meds possible. Started Norco 5/325mg QID prn, not effective, increased to 7.5/325mg QID prn, then 10/325mg QID prn with better relief, generally doing well now except during flares. Had bladder insterstim implanted with improvement in incontinence, no change to pain, stable still. Syncopal episodes, had cardiac cath. Worsening LBP, new MRI L-spine with L5/S1 listhesis 7mm, had fusion with Dr. mUaña with post-op pain, muscle spasms.       The following portions of the patient's history were reviewed and updated as appropriate: allergies, current medications, past family history, past medical history, past social history, past surgical history and problem list.    Review of Systems   Constitutional: Positive for fatigue. Negative for chills and fever.   HENT: Positive for trouble swallowing. Negative for hearing loss.    Eyes: Negative for visual disturbance.   Respiratory: Positive for shortness of breath.    Cardiovascular: Negative for chest pain.   Gastrointestinal: Positive for abdominal pain, constipation, diarrhea, nausea and vomiting.   Genitourinary: Negative for urinary incontinence.   Musculoskeletal: Positive for back pain. Negative for arthralgias, joint swelling, myalgias and neck pain.   Neurological: Positive for numbness and headache. Negative for dizziness and weakness.       Objective   Physical Exam   Constitutional: She is oriented to person, place, and time. She appears well-developed and well-nourished.   HENT:   Head: Normocephalic  and atraumatic.   Eyes: Pupils are equal, round, and reactive to light. EOM are normal.   Neck: Normal range of motion.   Cardiovascular: Normal rate, regular rhythm, normal heart sounds and intact distal pulses.   Pulmonary/Chest: Breath sounds normal.   Abdominal: Soft. Bowel sounds are normal. She exhibits no distension. There is no tenderness.   Neurological: She is alert and oriented to person, place, and time. She has normal strength and normal reflexes. She displays normal reflexes. No sensory deficit.   Psychiatric: She has a normal mood and affect. Her behavior is normal. Thought content normal.         Assessment/Plan   Megan was seen today for abdominal pain.    Diagnoses and all orders for this visit:    Rectal pain    Abdominal pain, unspecified abdominal location    Chronic bilateral low back pain without sciatica    Chronic pain disorder    Other long term (current) drug therapy    Neck pain        INspect reviewed, in order, small amount of oxycodone from surgeon. Low risk. UDS 9/3/19 in order.  Failed Norco 5/325mg QID prn, 7.5/325mg QID prn.  Increased to Norco 10/325mg q4h prn, hopefully reduce in future, discussed need to balance pain control with adverse side effects of opioids previously. Using short-acting opioids as pain varies considerably. Was taking brand name only following severe allergic reaction after pharmacy changed generics, but [facility] not carrying generic to which she is not allergic.  Cont other meds as prescribed.  Began Tizanidine 4mg TID prn, working well, failed Baclofen, cannot tolerate Flexeril.  RTC 3 months for f/u.

## 2020-07-14 ENCOUNTER — TELEPHONE (OUTPATIENT)
Dept: PAIN MEDICINE | Facility: HOSPITAL | Age: 45
End: 2020-07-14

## 2020-09-10 ENCOUNTER — OFFICE VISIT (OUTPATIENT)
Dept: PAIN MEDICINE | Facility: CLINIC | Age: 45
End: 2020-09-10

## 2020-09-10 ENCOUNTER — RESULTS ENCOUNTER (OUTPATIENT)
Dept: PAIN MEDICINE | Facility: CLINIC | Age: 45
End: 2020-09-10

## 2020-09-10 VITALS
OXYGEN SATURATION: 99 % | RESPIRATION RATE: 16 BRPM | HEIGHT: 64 IN | TEMPERATURE: 97.5 F | BODY MASS INDEX: 37.22 KG/M2 | SYSTOLIC BLOOD PRESSURE: 129 MMHG | HEART RATE: 70 BPM | DIASTOLIC BLOOD PRESSURE: 87 MMHG | WEIGHT: 218 LBS

## 2020-09-10 DIAGNOSIS — M54.2 NECK PAIN: ICD-10-CM

## 2020-09-10 DIAGNOSIS — K62.89 RECTAL PAIN: ICD-10-CM

## 2020-09-10 DIAGNOSIS — M54.50 CHRONIC BILATERAL LOW BACK PAIN WITHOUT SCIATICA: ICD-10-CM

## 2020-09-10 DIAGNOSIS — Z79.899 OTHER LONG TERM (CURRENT) DRUG THERAPY: ICD-10-CM

## 2020-09-10 DIAGNOSIS — G89.4 CHRONIC PAIN DISORDER: ICD-10-CM

## 2020-09-10 DIAGNOSIS — F19.90 CURRENT DRUG USE: ICD-10-CM

## 2020-09-10 DIAGNOSIS — R10.9 ABDOMINAL PAIN, UNSPECIFIED ABDOMINAL LOCATION: ICD-10-CM

## 2020-09-10 DIAGNOSIS — G89.29 CHRONIC BILATERAL LOW BACK PAIN WITHOUT SCIATICA: ICD-10-CM

## 2020-09-10 DIAGNOSIS — F19.90 CURRENT DRUG USE: Primary | ICD-10-CM

## 2020-09-10 PROCEDURE — 99213 OFFICE O/P EST LOW 20 MIN: CPT | Performed by: PHYSICAL MEDICINE & REHABILITATION

## 2020-09-10 PROCEDURE — G0463 HOSPITAL OUTPT CLINIC VISIT: HCPCS | Performed by: PHYSICAL MEDICINE & REHABILITATION

## 2020-09-10 RX ORDER — HYDROCODONE BITARTRATE AND ACETAMINOPHEN 10; 325 MG/1; MG/1
1 TABLET ORAL EVERY 4 HOURS PRN
Qty: 180 TABLET | Refills: 0 | Status: SHIPPED | OUTPATIENT
Start: 2020-09-10 | End: 2020-09-10 | Stop reason: SDUPTHER

## 2020-09-10 RX ORDER — HYDROCODONE BITARTRATE AND ACETAMINOPHEN 10; 325 MG/1; MG/1
1 TABLET ORAL EVERY 4 HOURS PRN
Qty: 180 TABLET | Refills: 0 | Status: SHIPPED | OUTPATIENT
Start: 2020-09-10 | End: 2020-12-10 | Stop reason: SDUPTHER

## 2020-09-10 NOTE — PROGRESS NOTES
Jacy Klein is a 45 y.o. female.     lower abdominal pain, rectal pain, began 2012 with dz of Ulcerative Colitis, 9/10 at worst, 7/10 at best, 6/10 today, always present, varies with UC, helped only by meds, was taking Tramadol with modest benefit, no NSAIDs with UC, also takes Humira and other UC meds. X-ray pelvis w/o acute injury. CT head with sinusitis only. Prior notes reviewed, as above, referred for pain management, wants to take minimum pain meds possible. Started Norco 5/325mg QID prn, not effective, increased to 7.5/325mg QID prn, then 10/325mg QID prn with better relief, generally doing well now except during flares. Had bladder insterstim implanted with improvement in incontinence, no change to pain, stable still. Syncopal episodes, had cardiac cath. Worsening LBP, new MRI L-spine with L5/S1 listhesis 7mm, had fusion with Dr. Umaña with post-op pain, muscle spasms.       The following portions of the patient's history were reviewed and updated as appropriate: allergies, current medications, past family history, past medical history, past social history, past surgical history and problem list.    Review of Systems   Constitutional: Positive for fatigue. Negative for chills and fever.   HENT: Positive for trouble swallowing. Negative for hearing loss.    Eyes: Negative for visual disturbance.   Respiratory: Positive for shortness of breath.    Cardiovascular: Negative for chest pain.   Gastrointestinal: Positive for abdominal pain, constipation, diarrhea, nausea and vomiting.   Genitourinary: Negative for urinary incontinence.   Musculoskeletal: Positive for back pain. Negative for arthralgias, joint swelling, myalgias and neck pain.   Neurological: Positive for numbness and headache. Negative for dizziness and weakness.       Objective   Physical Exam   Constitutional: She is oriented to person, place, and time. She appears well-developed and well-nourished.   HENT:   Head: Normocephalic  and atraumatic.   Eyes: Pupils are equal, round, and reactive to light. EOM are normal.   Neck: Normal range of motion.   Cardiovascular: Normal rate, regular rhythm, normal heart sounds and intact distal pulses.   Pulmonary/Chest: Breath sounds normal.   Abdominal: Soft. Bowel sounds are normal. She exhibits no distension. There is no tenderness.   Neurological: She is alert and oriented to person, place, and time. She has normal strength and normal reflexes. She displays normal reflexes. No sensory deficit.   Psychiatric: She has a normal mood and affect. Her behavior is normal. Thought content normal.         Assessment/Plan   Megan was seen today for abdominal pain.    Diagnoses and all orders for this visit:    Chronic bilateral low back pain without sciatica    Abdominal pain, unspecified abdominal location    Rectal pain    Chronic pain disorder    Neck pain    Other long term (current) drug therapy        INspect reviewed, in order, small amount of oxycodone from surgeon. Low risk. UDS 9/3/19 in order.  Failed Norco 5/325mg QID prn, 7.5/325mg QID prn.  Increased to Norco 10/325mg q4h prn, hopefully reduce in future, discussed need to balance pain control with adverse side effects of opioids previously. Using short-acting opioids as pain varies considerably. Was taking brand name only following severe allergic reaction after pharmacy changed generics, but [facility] not carrying generic to which she is not allergic.  Cont other meds as prescribed.  Began Tizanidine 4mg TID prn, working well, failed Baclofen, cannot tolerate Flexeril.  RTC 3 months for f/u.

## 2020-12-08 ENCOUNTER — APPOINTMENT (OUTPATIENT)
Dept: PAIN MEDICINE | Facility: CLINIC | Age: 45
End: 2020-12-08

## 2020-12-10 ENCOUNTER — OFFICE VISIT (OUTPATIENT)
Dept: PAIN MEDICINE | Facility: CLINIC | Age: 45
End: 2020-12-10

## 2020-12-10 VITALS
BODY MASS INDEX: 37.56 KG/M2 | RESPIRATION RATE: 16 BRPM | HEIGHT: 64 IN | OXYGEN SATURATION: 98 % | TEMPERATURE: 96.9 F | HEART RATE: 72 BPM | WEIGHT: 220 LBS | DIASTOLIC BLOOD PRESSURE: 79 MMHG | SYSTOLIC BLOOD PRESSURE: 117 MMHG

## 2020-12-10 DIAGNOSIS — R10.9 ABDOMINAL PAIN, UNSPECIFIED ABDOMINAL LOCATION: ICD-10-CM

## 2020-12-10 DIAGNOSIS — G89.29 CHRONIC BILATERAL LOW BACK PAIN WITHOUT SCIATICA: Primary | ICD-10-CM

## 2020-12-10 DIAGNOSIS — M54.50 CHRONIC BILATERAL LOW BACK PAIN WITHOUT SCIATICA: Primary | ICD-10-CM

## 2020-12-10 DIAGNOSIS — Z79.899 OTHER LONG TERM (CURRENT) DRUG THERAPY: ICD-10-CM

## 2020-12-10 DIAGNOSIS — G89.4 CHRONIC PAIN DISORDER: ICD-10-CM

## 2020-12-10 DIAGNOSIS — M54.2 NECK PAIN: ICD-10-CM

## 2020-12-10 DIAGNOSIS — K62.89 RECTAL PAIN: ICD-10-CM

## 2020-12-10 PROCEDURE — 99213 OFFICE O/P EST LOW 20 MIN: CPT | Performed by: PHYSICAL MEDICINE & REHABILITATION

## 2020-12-10 RX ORDER — HYDROCODONE BITARTRATE AND ACETAMINOPHEN 10; 325 MG/1; MG/1
1 TABLET ORAL EVERY 4 HOURS PRN
Qty: 180 TABLET | Refills: 0 | Status: SHIPPED | OUTPATIENT
Start: 2020-12-10 | End: 2021-03-04 | Stop reason: SDUPTHER

## 2020-12-10 NOTE — PROGRESS NOTES
Jacy Klein is a 45 y.o. female.     lower abdominal pain, rectal pain, began 2012 with dz of Ulcerative Colitis, 9/10 at worst, 7/10 at best, 6/10 today, always present, varies with UC, helped only by meds, was taking Tramadol with modest benefit, no NSAIDs with UC, also takes Humira and other UC meds. X-ray pelvis w/o acute injury. CT head with sinusitis only. Prior notes reviewed, as above, referred for pain management, wants to take minimum pain meds possible. Started Norco 5/325mg QID prn, not effective, increased to 7.5/325mg QID prn, then 10/325mg QID prn with better relief, generally doing well now except during flares. Had bladder insterstim implanted with improvement in incontinence, no change to pain, stable still. Syncopal episodes, had cardiac cath. Worsening LBP, new MRI L-spine with L5/S1 listhesis 7mm, had fusion with Dr. Umaña with post-op pain, muscle spasms.       The following portions of the patient's history were reviewed and updated as appropriate: allergies, current medications, past family history, past medical history, past social history, past surgical history and problem list.    Review of Systems   Constitutional: Positive for fatigue. Negative for chills and fever.   HENT: Positive for trouble swallowing. Negative for hearing loss.    Eyes: Negative for visual disturbance.   Respiratory: Positive for shortness of breath.    Cardiovascular: Negative for chest pain.   Gastrointestinal: Positive for abdominal pain, constipation, diarrhea, nausea and vomiting.   Genitourinary: Negative for urinary incontinence.   Musculoskeletal: Positive for back pain. Negative for arthralgias, joint swelling, myalgias and neck pain.   Neurological: Positive for numbness and headache. Negative for dizziness and weakness.       Objective   Physical Exam   Constitutional: She is oriented to person, place, and time. She appears well-developed and well-nourished.   HENT:   Head: Normocephalic  and atraumatic.   Eyes: Pupils are equal, round, and reactive to light.   Neck: Normal range of motion.   Cardiovascular: Normal rate, regular rhythm and normal heart sounds.   Pulmonary/Chest: Breath sounds normal.   Abdominal: Soft. Bowel sounds are normal. She exhibits no distension. There is no abdominal tenderness.   Neurological: She is alert and oriented to person, place, and time. She has normal reflexes. She displays normal reflexes. No sensory deficit.   Psychiatric: Her behavior is normal. Thought content normal.         Assessment/Plan   Diagnoses and all orders for this visit:    1. Chronic bilateral low back pain without sciatica (Primary)    2. Chronic pain disorder    3. Neck pain    4. Other long term (current) drug therapy    5. Abdominal pain, unspecified abdominal location    6. Rectal pain        INspect reviewed, in order, small amount of oxycodone from surgeon. Low risk. UDS 9/10/20 in order.  Failed Norco 5/325mg QID prn, 7.5/325mg QID prn.  Increased to Norco 10/325mg q4h prn, hopefully reduce in future, discussed need to balance pain control with adverse side effects of opioids previously. Using short-acting opioids as pain varies considerably. Was taking brand name only following severe allergic reaction after pharmacy changed generics, but [facility] not carrying generic to which she is not allergic.  Cont other meds as prescribed.  Began Tizanidine 4mg TID prn, working well, failed Baclofen, cannot tolerate Flexeril.  RTC 3 months for f/u.

## 2021-03-04 ENCOUNTER — OFFICE VISIT (OUTPATIENT)
Dept: PAIN MEDICINE | Facility: CLINIC | Age: 46
End: 2021-03-04

## 2021-03-04 VITALS
DIASTOLIC BLOOD PRESSURE: 83 MMHG | SYSTOLIC BLOOD PRESSURE: 119 MMHG | WEIGHT: 220 LBS | HEIGHT: 64 IN | TEMPERATURE: 97.1 F | HEART RATE: 80 BPM | BODY MASS INDEX: 37.56 KG/M2

## 2021-03-04 DIAGNOSIS — M54.50 CHRONIC BILATERAL LOW BACK PAIN WITHOUT SCIATICA: Primary | ICD-10-CM

## 2021-03-04 DIAGNOSIS — Z79.899 OTHER LONG TERM (CURRENT) DRUG THERAPY: ICD-10-CM

## 2021-03-04 DIAGNOSIS — R10.9 ABDOMINAL PAIN, UNSPECIFIED ABDOMINAL LOCATION: ICD-10-CM

## 2021-03-04 DIAGNOSIS — G89.4 CHRONIC PAIN DISORDER: ICD-10-CM

## 2021-03-04 DIAGNOSIS — K62.89 RECTAL PAIN: ICD-10-CM

## 2021-03-04 DIAGNOSIS — G89.29 CHRONIC BILATERAL LOW BACK PAIN WITHOUT SCIATICA: Primary | ICD-10-CM

## 2021-03-04 DIAGNOSIS — M54.2 NECK PAIN: ICD-10-CM

## 2021-03-04 PROCEDURE — 99213 OFFICE O/P EST LOW 20 MIN: CPT | Performed by: PHYSICAL MEDICINE & REHABILITATION

## 2021-03-04 RX ORDER — VENLAFAXINE HYDROCHLORIDE 150 MG/1
150 CAPSULE, EXTENDED RELEASE ORAL DAILY
COMMUNITY
Start: 2021-01-31

## 2021-03-04 RX ORDER — HYDROCODONE BITARTRATE AND ACETAMINOPHEN 10; 325 MG/1; MG/1
1 TABLET ORAL EVERY 4 HOURS PRN
Qty: 180 TABLET | Refills: 0 | Status: SHIPPED | OUTPATIENT
Start: 2021-03-04 | End: 2021-06-03 | Stop reason: SDUPTHER

## 2021-03-04 RX ORDER — OLOPATADINE HYDROCHLORIDE 2 MG/ML
SOLUTION/ DROPS OPHTHALMIC
COMMUNITY
Start: 2020-12-21

## 2021-03-04 RX ORDER — AMOXICILLIN AND CLAVULANATE POTASSIUM 875; 125 MG/1; MG/1
TABLET, FILM COATED ORAL
COMMUNITY
Start: 2021-02-25 | End: 2021-08-27

## 2021-03-04 RX ORDER — IBUPROFEN 800 MG/1
800 TABLET ORAL 2 TIMES DAILY PRN
COMMUNITY
Start: 2021-02-20

## 2021-03-04 NOTE — PROGRESS NOTES
Jacy Klein is a 45 y.o. female.     lower abdominal pain, rectal pain, began 2012 with dz of Ulcerative Colitis, 9/10 at worst, 7/10 at best, 6/10 today, always present, varies with UC, helped only by meds, was taking Tramadol with modest benefit, no NSAIDs with UC, also takes Humira and other UC meds. X-ray pelvis w/o acute injury. CT head with sinusitis only. Prior notes reviewed, as above, referred for pain management, wants to take minimum pain meds possible. Started Norco 5/325mg QID prn, not effective, increased to 7.5/325mg QID prn, then 10/325mg QID prn with better relief, generally doing well now except during flares. Had bladder insterstim implanted with improvement in incontinence, no change to pain, stable still. Syncopal episodes, had cardiac cath. Worsening LBP, new MRI L-spine with L5/S1 listhesis 7mm, had fusion with Dr. Umaña with post-op pain, muscle spasms.       The following portions of the patient's history were reviewed and updated as appropriate: allergies, current medications, past family history, past medical history, past social history, past surgical history and problem list.    Review of Systems   Constitutional: Positive for fatigue. Negative for chills and fever.   HENT: Positive for trouble swallowing. Negative for hearing loss.    Eyes: Negative for visual disturbance.   Respiratory: Positive for shortness of breath.    Cardiovascular: Negative for chest pain.   Gastrointestinal: Positive for abdominal pain, constipation, diarrhea, nausea and vomiting.   Genitourinary: Negative for urinary incontinence.   Musculoskeletal: Positive for back pain. Negative for arthralgias, joint swelling, myalgias and neck pain.   Neurological: Positive for numbness and headache. Negative for dizziness and weakness.       Objective   Physical Exam   Constitutional: She is oriented to person, place, and time. She appears well-developed and well-nourished.   HENT:   Head: Normocephalic  and atraumatic.   Eyes: Pupils are equal, round, and reactive to light.   Neck: Normal range of motion.   Cardiovascular: Normal rate, regular rhythm and normal heart sounds.   Pulmonary/Chest: Breath sounds normal.   Abdominal: Soft. Bowel sounds are normal. She exhibits no distension. There is no abdominal tenderness.   Neurological: She is alert and oriented to person, place, and time. She has normal reflexes. She displays normal reflexes. No sensory deficit.   Psychiatric: Her behavior is normal. Thought content normal.         Assessment/Plan   Diagnoses and all orders for this visit:    1. Chronic bilateral low back pain without sciatica (Primary)    2. Neck pain    3. Abdominal pain, unspecified abdominal location    4. Chronic pain disorder    5. Other long term (current) drug therapy    6. Rectal pain        INspect reviewed, in order, small amount of oxycodone from surgeon. Low risk. UDS 9/10/20 in order.  Failed Norco 5/325mg QID prn, 7.5/325mg QID prn.  Increased to Norco 10/325mg q4h prn, hopefully reduce in future, discussed need to balance pain control with adverse side effects of opioids previously. Using short-acting opioids as pain varies considerably. Was taking brand name only following severe allergic reaction after pharmacy changed generics, but [facility] not carrying generic to which she is not allergic.  Cont other meds as prescribed.  Began Tizanidine 4mg TID prn, working well, failed Baclofen, cannot tolerate Flexeril.  RTC 3 months for f/u.

## 2021-06-02 RX ORDER — TIZANIDINE 4 MG/1
TABLET ORAL
Qty: 270 TABLET | Refills: 3 | Status: SHIPPED | OUTPATIENT
Start: 2021-06-02 | End: 2022-03-23

## 2021-06-03 ENCOUNTER — OFFICE VISIT (OUTPATIENT)
Dept: PAIN MEDICINE | Facility: CLINIC | Age: 46
End: 2021-06-03

## 2021-06-03 VITALS
WEIGHT: 208 LBS | SYSTOLIC BLOOD PRESSURE: 129 MMHG | HEART RATE: 78 BPM | BODY MASS INDEX: 35.51 KG/M2 | RESPIRATION RATE: 16 BRPM | OXYGEN SATURATION: 99 % | HEIGHT: 64 IN | DIASTOLIC BLOOD PRESSURE: 86 MMHG

## 2021-06-03 DIAGNOSIS — M54.2 NECK PAIN: ICD-10-CM

## 2021-06-03 DIAGNOSIS — Z79.899 OTHER LONG TERM (CURRENT) DRUG THERAPY: ICD-10-CM

## 2021-06-03 DIAGNOSIS — M54.50 CHRONIC BILATERAL LOW BACK PAIN WITHOUT SCIATICA: Primary | ICD-10-CM

## 2021-06-03 DIAGNOSIS — K62.89 RECTAL PAIN: ICD-10-CM

## 2021-06-03 DIAGNOSIS — G89.29 CHRONIC BILATERAL LOW BACK PAIN WITHOUT SCIATICA: Primary | ICD-10-CM

## 2021-06-03 DIAGNOSIS — G89.4 CHRONIC PAIN DISORDER: ICD-10-CM

## 2021-06-03 DIAGNOSIS — R10.9 ABDOMINAL PAIN, UNSPECIFIED ABDOMINAL LOCATION: ICD-10-CM

## 2021-06-03 PROCEDURE — 99213 OFFICE O/P EST LOW 20 MIN: CPT | Performed by: PHYSICAL MEDICINE & REHABILITATION

## 2021-06-03 RX ORDER — HYDROCODONE BITARTRATE AND ACETAMINOPHEN 10; 325 MG/1; MG/1
1 TABLET ORAL EVERY 4 HOURS PRN
Qty: 180 TABLET | Refills: 0 | Status: SHIPPED | OUTPATIENT
Start: 2021-06-03 | End: 2021-08-27 | Stop reason: SDUPTHER

## 2021-06-03 NOTE — PROGRESS NOTES
Jacy Klein is a 46 y.o. female.     lower abdominal pain, rectal pain, began 2012 with dz of Ulcerative Colitis, 9/10 at worst, 7/10 at best, 6/10 today, always present, varies with UC, helped only by meds, was taking Tramadol with modest benefit, no NSAIDs with UC, also takes Humira and other UC meds. X-ray pelvis w/o acute injury. CT head with sinusitis only. Prior notes reviewed, as above, referred for pain management, wants to take minimum pain meds possible. Started Norco 5/325mg QID prn, not effective, increased to 7.5/325mg QID prn, then 10/325mg QID prn with better relief, generally doing well now except during flares. Had bladder insterstim implanted with improvement in incontinence, no change to pain, stable still. Syncopal episodes, had cardiac cath. Worsening LBP, new MRI L-spine with L5/S1 listhesis 7mm, had fusion with Dr. Umaña with post-op pain, muscle spasms.       The following portions of the patient's history were reviewed and updated as appropriate: allergies, current medications, past family history, past medical history, past social history, past surgical history and problem list.    Review of Systems   Constitutional: Positive for fatigue. Negative for chills and fever.   HENT: Positive for trouble swallowing. Negative for hearing loss.    Eyes: Negative for visual disturbance.   Respiratory: Positive for shortness of breath.    Cardiovascular: Negative for chest pain.   Gastrointestinal: Positive for abdominal pain, constipation, diarrhea, nausea and vomiting.   Genitourinary: Negative for urinary incontinence.   Musculoskeletal: Positive for back pain. Negative for arthralgias, joint swelling, myalgias and neck pain.   Neurological: Positive for numbness and headache. Negative for dizziness and weakness.       Objective   Physical Exam   Constitutional: She is oriented to person, place, and time. She appears well-developed and well-nourished.   HENT:   Head: Normocephalic  and atraumatic.   Eyes: Pupils are equal, round, and reactive to light.   Cardiovascular: Normal rate, regular rhythm and normal heart sounds.   Pulmonary/Chest: Breath sounds normal.   Abdominal: Soft. Bowel sounds are normal. She exhibits no distension. There is no abdominal tenderness.   Neurological: She is alert and oriented to person, place, and time. She has normal reflexes. She displays normal reflexes. No sensory deficit.   Psychiatric: Her behavior is normal. Thought content normal.         Assessment/Plan   Diagnoses and all orders for this visit:    1. Chronic bilateral low back pain without sciatica (Primary)    2. Neck pain    3. Abdominal pain, unspecified abdominal location    4. Chronic pain disorder    5. Other long term (current) drug therapy    6. Rectal pain        INspect reviewed, in order, small amount of oxycodone from surgeon. Low risk. UDS 9/10/20 in order.  Failed Norco 5/325mg QID prn, 7.5/325mg QID prn.  Increased to Norco 10/325mg q4h prn, hopefully reduce in future, discussed need to balance pain control with adverse side effects of opioids previously. Using short-acting opioids as pain varies considerably. Was taking brand name only following severe allergic reaction after pharmacy changed generics, but using [facility] now.  Cont other meds as prescribed.  Began Tizanidine 4mg TID prn, working well, failed Baclofen, cannot tolerate Flexeril.  RTC 3 months for f/u. Uses [facility].

## 2021-07-14 ENCOUNTER — TRANSCRIBE ORDERS (OUTPATIENT)
Dept: ADMINISTRATIVE | Facility: HOSPITAL | Age: 46
End: 2021-07-14

## 2021-07-14 DIAGNOSIS — M25.561 RIGHT KNEE PAIN, UNSPECIFIED CHRONICITY: Primary | ICD-10-CM

## 2021-07-26 ENCOUNTER — TRANSCRIBE ORDERS (OUTPATIENT)
Dept: ADMINISTRATIVE | Facility: HOSPITAL | Age: 46
End: 2021-07-26

## 2021-08-09 ENCOUNTER — HOSPITAL ENCOUNTER (OUTPATIENT)
Dept: INTERVENTIONAL RADIOLOGY/VASCULAR | Facility: HOSPITAL | Age: 46
Discharge: HOME | End: 2021-08-09

## 2021-08-09 ENCOUNTER — HOSPITAL ENCOUNTER (OUTPATIENT)
Dept: CT IMAGING | Facility: HOSPITAL | Age: 46
Discharge: HOME | End: 2021-08-09

## 2021-08-09 DIAGNOSIS — M25.561 RIGHT KNEE PAIN, UNSPECIFIED CHRONICITY: ICD-10-CM

## 2021-08-09 PROCEDURE — 0 IOPAMIDOL PER 1 ML: Performed by: ORTHOPAEDIC SURGERY

## 2021-08-09 PROCEDURE — 73701 CT LOWER EXTREMITY W/DYE: CPT

## 2021-08-09 PROCEDURE — 73580 CONTRAST X-RAY OF KNEE JOINT: CPT

## 2021-08-09 PROCEDURE — 77002 NEEDLE LOCALIZATION BY XRAY: CPT

## 2021-08-09 PROCEDURE — 25010000003 LIDOCAINE 1 % SOLUTION: Performed by: ORTHOPAEDIC SURGERY

## 2021-08-09 RX ORDER — LIDOCAINE HYDROCHLORIDE 10 MG/ML
5 INJECTION, SOLUTION INFILTRATION; PERINEURAL ONCE
Status: COMPLETED | OUTPATIENT
Start: 2021-08-09 | End: 2021-08-09

## 2021-08-09 RX ADMIN — LIDOCAINE HYDROCHLORIDE 2 ML: 10 INJECTION, SOLUTION INFILTRATION; PERINEURAL at 14:19

## 2021-08-09 RX ADMIN — IOPAMIDOL 10 ML: 755 INJECTION, SOLUTION INTRAVENOUS at 14:19

## 2021-08-19 ENCOUNTER — APPOINTMENT (OUTPATIENT)
Dept: MRI IMAGING | Facility: HOSPITAL | Age: 46
End: 2021-08-19

## 2021-08-27 ENCOUNTER — OFFICE VISIT (OUTPATIENT)
Dept: PAIN MEDICINE | Facility: CLINIC | Age: 46
End: 2021-08-27

## 2021-08-27 VITALS
BODY MASS INDEX: 35.51 KG/M2 | HEART RATE: 76 BPM | WEIGHT: 208 LBS | HEIGHT: 64 IN | DIASTOLIC BLOOD PRESSURE: 74 MMHG | RESPIRATION RATE: 16 BRPM | OXYGEN SATURATION: 98 % | SYSTOLIC BLOOD PRESSURE: 106 MMHG

## 2021-08-27 DIAGNOSIS — M54.2 NECK PAIN: ICD-10-CM

## 2021-08-27 DIAGNOSIS — R10.9 ABDOMINAL PAIN, UNSPECIFIED ABDOMINAL LOCATION: ICD-10-CM

## 2021-08-27 DIAGNOSIS — G89.4 CHRONIC PAIN DISORDER: ICD-10-CM

## 2021-08-27 DIAGNOSIS — Z79.899 HIGH RISK MEDICATION USE: Primary | ICD-10-CM

## 2021-08-27 DIAGNOSIS — G89.29 CHRONIC BILATERAL LOW BACK PAIN WITHOUT SCIATICA: Primary | ICD-10-CM

## 2021-08-27 DIAGNOSIS — K62.89 RECTAL PAIN: ICD-10-CM

## 2021-08-27 DIAGNOSIS — M54.50 CHRONIC BILATERAL LOW BACK PAIN WITHOUT SCIATICA: Primary | ICD-10-CM

## 2021-08-27 DIAGNOSIS — Z79.899 OTHER LONG TERM (CURRENT) DRUG THERAPY: ICD-10-CM

## 2021-08-27 PROCEDURE — 99213 OFFICE O/P EST LOW 20 MIN: CPT | Performed by: PHYSICAL MEDICINE & REHABILITATION

## 2021-08-27 RX ORDER — HYDROCODONE BITARTRATE AND ACETAMINOPHEN 10; 325 MG/1; MG/1
1 TABLET ORAL EVERY 4 HOURS PRN
Qty: 180 TABLET | Refills: 0 | Status: SHIPPED | OUTPATIENT
Start: 2021-08-27 | End: 2021-11-19 | Stop reason: SDUPTHER

## 2021-08-27 RX ORDER — VENLAFAXINE HYDROCHLORIDE 75 MG/1
CAPSULE, EXTENDED RELEASE ORAL
COMMUNITY
Start: 2021-07-19

## 2021-08-27 NOTE — PROGRESS NOTES
Jacy Klein is a 46 y.o. female.     lower abdominal pain, rectal pain, began 2012 with dz of Ulcerative Colitis, 9/10 at worst, 7/10 at best, 6/10 today, always present, varies with UC, helped only by meds, was taking Tramadol with modest benefit, no NSAIDs with UC, also takes Humira and other UC meds. X-ray pelvis w/o acute injury. CT head with sinusitis only. Prior notes reviewed, as above, referred for pain management, wants to take minimum pain meds possible. Started Norco 5/325mg QID prn, not effective, increased to 7.5/325mg QID prn, then 10/325mg QID prn with better relief, generally doing well now except during flares. Had bladder insterstim implanted with improvement in incontinence, no change to pain, stable still. Syncopal episodes, had cardiac cath. Worsening LBP, new MRI L-spine with L5/S1 listhesis 7mm, had fusion with Dr. Umaña with post-op pain, muscle spasms.       The following portions of the patient's history were reviewed and updated as appropriate: allergies, current medications, past family history, past medical history, past social history, past surgical history and problem list.    Review of Systems   Constitutional: Positive for fatigue. Negative for chills and fever.   HENT: Positive for trouble swallowing. Negative for hearing loss.    Eyes: Negative for visual disturbance.   Respiratory: Positive for shortness of breath.    Cardiovascular: Negative for chest pain.   Gastrointestinal: Positive for abdominal pain, constipation, diarrhea, nausea and vomiting.   Genitourinary: Negative for urinary incontinence.   Musculoskeletal: Positive for back pain. Negative for arthralgias, joint swelling, myalgias and neck pain.   Neurological: Positive for numbness and headache. Negative for dizziness and weakness.       Objective   Physical Exam   Constitutional: She is oriented to person, place, and time. She appears well-developed and well-nourished.   HENT:   Head: Normocephalic  and atraumatic.   Eyes: Pupils are equal, round, and reactive to light.   Cardiovascular: Normal rate, regular rhythm and normal heart sounds.   Pulmonary/Chest: Breath sounds normal.   Abdominal: Soft. Bowel sounds are normal. She exhibits no distension. There is no abdominal tenderness.   Neurological: She is alert and oriented to person, place, and time. She has normal reflexes. She displays normal reflexes. No sensory deficit.   Psychiatric: Her behavior is normal. Thought content normal.         Assessment/Plan   Diagnoses and all orders for this visit:    1. Chronic bilateral low back pain without sciatica (Primary)    2. Neck pain    3. Abdominal pain, unspecified abdominal location    4. Chronic pain disorder    5. Other long term (current) drug therapy    6. Rectal pain        INspect reviewed, in order, small amount of oxycodone from surgeon. Low risk. UDS 9/10/20 in order.  Failed Norco 5/325mg QID prn, 7.5/325mg QID prn.  Increased to Norco 10/325mg q4h prn, hopefully reduce in future, discussed need to balance pain control with adverse side effects of opioids previously. Using short-acting opioids as pain varies considerably. Was taking brand name only following severe allergic reaction after pharmacy changed generics, but using [facility] now.  Cont other meds as prescribed.  Began Tizanidine 4mg TID prn, working well, failed Baclofen, cannot tolerate Flexeril.  RTC 3 months for f/u. Uses [facility].

## 2021-11-19 ENCOUNTER — OFFICE VISIT (OUTPATIENT)
Dept: PAIN MEDICINE | Facility: CLINIC | Age: 46
End: 2021-11-19

## 2021-11-19 VITALS
BODY MASS INDEX: 35.51 KG/M2 | WEIGHT: 208 LBS | RESPIRATION RATE: 16 BRPM | HEART RATE: 84 BPM | DIASTOLIC BLOOD PRESSURE: 83 MMHG | SYSTOLIC BLOOD PRESSURE: 120 MMHG | HEIGHT: 64 IN | OXYGEN SATURATION: 97 %

## 2021-11-19 DIAGNOSIS — M54.2 NECK PAIN: ICD-10-CM

## 2021-11-19 DIAGNOSIS — Z79.899 OTHER LONG TERM (CURRENT) DRUG THERAPY: ICD-10-CM

## 2021-11-19 DIAGNOSIS — M54.50 CHRONIC BILATERAL LOW BACK PAIN WITHOUT SCIATICA: Primary | ICD-10-CM

## 2021-11-19 DIAGNOSIS — R10.9 ABDOMINAL PAIN, UNSPECIFIED ABDOMINAL LOCATION: ICD-10-CM

## 2021-11-19 DIAGNOSIS — G89.29 CHRONIC BILATERAL LOW BACK PAIN WITHOUT SCIATICA: Primary | ICD-10-CM

## 2021-11-19 PROCEDURE — 99213 OFFICE O/P EST LOW 20 MIN: CPT | Performed by: PHYSICAL MEDICINE & REHABILITATION

## 2021-11-19 RX ORDER — ALBUTEROL SULFATE 2.5 MG/3ML
SOLUTION RESPIRATORY (INHALATION)
COMMUNITY
End: 2021-11-19 | Stop reason: ALTCHOICE

## 2021-11-19 RX ORDER — HYDROCODONE BITARTRATE AND ACETAMINOPHEN 10; 325 MG/1; MG/1
1 TABLET ORAL EVERY 4 HOURS PRN
Qty: 180 TABLET | Refills: 0 | Status: SHIPPED | OUTPATIENT
Start: 2021-11-19 | End: 2022-05-06 | Stop reason: SDUPTHER

## 2021-11-19 RX ORDER — ALBUTEROL SULFATE 2.5 MG/3ML
SOLUTION RESPIRATORY (INHALATION)
COMMUNITY
Start: 2021-11-06

## 2021-11-19 RX ORDER — HYDROCODONE BITARTRATE AND ACETAMINOPHEN 10; 325 MG/1; MG/1
1 TABLET ORAL EVERY 4 HOURS PRN
Qty: 180 TABLET | Refills: 0 | Status: SHIPPED | OUTPATIENT
Start: 2021-11-19 | End: 2022-02-11 | Stop reason: SDUPTHER

## 2021-11-19 NOTE — PROGRESS NOTES
Subjective   Megan Klein is a 46 y.o. female.     lower abdominal pain, rectal pain, began 2012 with dz of Ulcerative Colitis, 9/10 at worst, 7/10 at best, 6/10 today, always present, varies with UC, helped only by meds, was taking Tramadol with modest benefit, no NSAIDs with UC, also takes Humira and other UC meds. X-ray pelvis w/o acute injury. CT head with sinusitis only. Prior notes reviewed, as above, referred for pain management, wants to take minimum pain meds possible. Started Norco 5/325mg QID prn, not effective, increased to 7.5/325mg QID prn, then 10/325mg QID prn with better relief, generally doing well now except during flares. Had bladder insterstim implanted with improvement in incontinence, no change to pain, stable still. Syncopal episodes, had cardiac cath. Worsening LBP, new MRI L-spine with L5/S1 listhesis 7mm, had fusion with Dr. Umaña with post-op pain, muscle spasms.    Abdominal Pain  Associated symptoms include constipation, diarrhea, nausea and vomiting. Pertinent negatives include no arthralgias, fever or myalgias.        The following portions of the patient's history were reviewed and updated as appropriate: allergies, current medications, past family history, past medical history, past social history, past surgical history and problem list.    Review of Systems   Constitutional: Positive for fatigue. Negative for chills and fever.   HENT: Positive for trouble swallowing. Negative for hearing loss.    Eyes: Negative for visual disturbance.   Respiratory: Positive for shortness of breath.    Cardiovascular: Negative for chest pain.   Gastrointestinal: Positive for abdominal pain, constipation, diarrhea, nausea and vomiting.   Genitourinary: Negative for urinary incontinence.   Musculoskeletal: Positive for back pain. Negative for arthralgias, joint swelling, myalgias and neck pain.   Neurological: Positive for numbness and headache. Negative for dizziness and weakness.       Objective    Physical Exam   Constitutional: She is oriented to person, place, and time. She appears well-developed and well-nourished.   HENT:   Head: Normocephalic and atraumatic.   Eyes: Pupils are equal, round, and reactive to light.   Cardiovascular: Normal rate, regular rhythm and normal heart sounds.   Pulmonary/Chest: Breath sounds normal.   Abdominal: Soft. Bowel sounds are normal. She exhibits no distension. There is no abdominal tenderness.   Neurological: She is alert and oriented to person, place, and time. She has normal reflexes. She displays normal reflexes. No sensory deficit.   Psychiatric: Her behavior is normal. Thought content normal.         Assessment/Plan   Diagnoses and all orders for this visit:    1. Chronic bilateral low back pain without sciatica (Primary)    2. Neck pain    3. Abdominal pain, unspecified abdominal location    4. Other long term (current) drug therapy        INspect reviewed, in order, small amount of oxycodone from surgeon. Low risk. UDS 8/27/21 in order.  Failed Norco 5/325mg QID prn, 7.5/325mg QID prn.  Increased to Norco 10/325mg q4h prn, hopefully reduce in future, discussed need to balance pain control with adverse side effects of opioids previously. Using short-acting opioids as pain varies considerably. Was taking brand name only following severe allergic reaction after pharmacy changed generics, but using [facility] now.  Cont other meds as prescribed.  Began Tizanidine 4mg TID prn, working well, failed Baclofen, cannot tolerate Flexeril.  RTC 3 months for f/u. Uses [facility].                    Physical Exam  Constitutional:       Appearance: She is well-developed and well-nourished.   HENT:      Head: Normocephalic and atraumatic.   Eyes:      Pupils: Pupils are equal, round, and reactive to light.   Cardiovascular:      Rate and Rhythm: Normal rate and regular rhythm.      Heart sounds: Normal heart sounds.   Pulmonary:      Breath sounds: Normal  breath sounds.   Abdominal:      General: Bowel sounds are normal. There is no distension.      Palpations: Abdomen is soft.      Tenderness: There is no abdominal tenderness.   Neurological:      Mental Status: She is alert and oriented to person, place, and time.      Sensory: No sensory deficit.      Deep Tendon Reflexes: Reflexes are normal and symmetric. Reflexes normal.   Psychiatric:         Behavior: Behavior normal.         Thought Content: Thought content normal.

## 2022-02-11 ENCOUNTER — TELEPHONE (OUTPATIENT)
Dept: PAIN MEDICINE | Facility: CLINIC | Age: 47
End: 2022-02-11

## 2022-02-11 ENCOUNTER — OFFICE VISIT (OUTPATIENT)
Dept: PAIN MEDICINE | Facility: CLINIC | Age: 47
End: 2022-02-11

## 2022-02-11 VITALS
WEIGHT: 208 LBS | DIASTOLIC BLOOD PRESSURE: 77 MMHG | HEIGHT: 64 IN | OXYGEN SATURATION: 97 % | HEART RATE: 87 BPM | SYSTOLIC BLOOD PRESSURE: 148 MMHG | BODY MASS INDEX: 35.51 KG/M2 | RESPIRATION RATE: 16 BRPM

## 2022-02-11 DIAGNOSIS — R10.9 ABDOMINAL PAIN, UNSPECIFIED ABDOMINAL LOCATION: ICD-10-CM

## 2022-02-11 DIAGNOSIS — M54.2 NECK PAIN: ICD-10-CM

## 2022-02-11 DIAGNOSIS — G89.29 CHRONIC BILATERAL LOW BACK PAIN WITHOUT SCIATICA: ICD-10-CM

## 2022-02-11 DIAGNOSIS — M54.50 CHRONIC BILATERAL LOW BACK PAIN WITHOUT SCIATICA: Primary | ICD-10-CM

## 2022-02-11 DIAGNOSIS — Z79.899 OTHER LONG TERM (CURRENT) DRUG THERAPY: ICD-10-CM

## 2022-02-11 DIAGNOSIS — G89.29 CHRONIC BILATERAL LOW BACK PAIN WITHOUT SCIATICA: Primary | ICD-10-CM

## 2022-02-11 DIAGNOSIS — G89.4 CHRONIC PAIN DISORDER: ICD-10-CM

## 2022-02-11 DIAGNOSIS — K62.89 RECTAL PAIN: ICD-10-CM

## 2022-02-11 DIAGNOSIS — M54.50 CHRONIC BILATERAL LOW BACK PAIN WITHOUT SCIATICA: ICD-10-CM

## 2022-02-11 PROCEDURE — 99213 OFFICE O/P EST LOW 20 MIN: CPT | Performed by: PHYSICAL MEDICINE & REHABILITATION

## 2022-02-11 RX ORDER — HYDROCODONE BITARTRATE AND ACETAMINOPHEN 10; 325 MG/1; MG/1
1 TABLET ORAL EVERY 4 HOURS PRN
Qty: 180 TABLET | Refills: 0 | Status: SHIPPED | OUTPATIENT
Start: 2022-02-11 | End: 2022-05-06 | Stop reason: SDUPTHER

## 2022-02-11 RX ORDER — HYDROCODONE BITARTRATE AND ACETAMINOPHEN 10; 325 MG/1; MG/1
1 TABLET ORAL EVERY 4 HOURS PRN
Qty: 180 TABLET | Refills: 0 | Status: SHIPPED | OUTPATIENT
Start: 2022-02-11 | End: 2022-02-11 | Stop reason: SDUPTHER

## 2022-02-11 NOTE — PROGRESS NOTES
Subjective   Megan Klein is a 46 y.o. female.     lower abdominal pain, rectal pain, began 2012 with dz of Ulcerative Colitis, 9/10 at worst, 7/10 at best, 5/10 today, always present, varies with UC, helped only by meds, was taking Tramadol with modest benefit, no NSAIDs with UC, also takes Humira and other UC meds. X-ray pelvis w/o acute injury. CT head with sinusitis only. Prior notes reviewed, as above, referred for pain management, wants to take minimum pain meds possible. Started Norco 5/325mg QID prn, not effective, increased to 7.5/325mg QID prn, then 10/325mg QID prn with better relief, generally doing well now except during flares. Had bladder insterstim implanted with improvement in incontinence, no change to pain, stable still. Syncopal episodes, had cardiac cath. Worsening LBP, new MRI L-spine with L5/S1 listhesis 7mm, had fusion with Dr. Umaña with post-op pain, muscle spasms.    Abdominal Pain  Associated symptoms include constipation, diarrhea, nausea and vomiting. Pertinent negatives include no arthralgias, fever or myalgias.        The following portions of the patient's history were reviewed and updated as appropriate: allergies, current medications, past family history, past medical history, past social history, past surgical history and problem list.    Review of Systems   Constitutional: Positive for fatigue. Negative for chills and fever.   HENT: Positive for trouble swallowing. Negative for hearing loss.    Eyes: Negative for visual disturbance.   Respiratory: Positive for shortness of breath.    Cardiovascular: Negative for chest pain.   Gastrointestinal: Positive for abdominal pain, constipation, diarrhea, nausea and vomiting.   Genitourinary: Negative for urinary incontinence.   Musculoskeletal: Positive for back pain. Negative for arthralgias, joint swelling, myalgias and neck pain.   Neurological: Positive for numbness and headache. Negative for dizziness and weakness.       Objective    Physical Exam   Constitutional: She is oriented to person, place, and time. She appears well-developed and well-nourished.   HENT:   Head: Normocephalic and atraumatic.   Eyes: Pupils are equal, round, and reactive to light.   Cardiovascular: Normal rate, regular rhythm and normal heart sounds.   Pulmonary/Chest: Breath sounds normal.   Abdominal: Soft. Bowel sounds are normal. She exhibits no distension. There is no abdominal tenderness.   Neurological: She is alert and oriented to person, place, and time. She has normal reflexes. She displays normal reflexes. No sensory deficit.   Psychiatric: Her behavior is normal. Thought content normal.         Assessment/Plan   Diagnoses and all orders for this visit:    1. Chronic bilateral low back pain without sciatica (Primary)    2. Neck pain    3. Abdominal pain, unspecified abdominal location    4. Chronic pain disorder    5. Other long term (current) drug therapy        INspect reviewed, in order, small amount of oxycodone from surgeon. Low risk. UDS 8/27/21 in order.  Failed Norco 5/325mg QID prn, 7.5/325mg QID prn.  Increased to Norco 10/325mg q4h prn, hopefully reduce in future, discussed need to balance pain control with adverse side effects of opioids previously. Using short-acting opioids as pain varies considerably. Was taking brand name only following severe allergic reaction after pharmacy changed generics, but using [facility] now.  Cont other meds as prescribed.  Began Tizanidine 4mg TID prn, working well, failed Baclofen, cannot tolerate Flexeril.  RTC 3 months for f/u. Uses [facility].

## 2022-03-23 RX ORDER — TIZANIDINE 4 MG/1
TABLET ORAL
Qty: 270 TABLET | Refills: 3 | Status: SHIPPED | OUTPATIENT
Start: 2022-03-23 | End: 2022-04-22

## 2022-05-06 ENCOUNTER — OFFICE VISIT (OUTPATIENT)
Dept: PAIN MEDICINE | Facility: CLINIC | Age: 47
End: 2022-05-06

## 2022-05-06 VITALS
BODY MASS INDEX: 35.51 KG/M2 | SYSTOLIC BLOOD PRESSURE: 123 MMHG | WEIGHT: 208 LBS | HEART RATE: 94 BPM | OXYGEN SATURATION: 96 % | DIASTOLIC BLOOD PRESSURE: 83 MMHG | HEIGHT: 64 IN | RESPIRATION RATE: 16 BRPM

## 2022-05-06 DIAGNOSIS — M54.50 CHRONIC BILATERAL LOW BACK PAIN WITHOUT SCIATICA: Primary | ICD-10-CM

## 2022-05-06 DIAGNOSIS — G89.29 CHRONIC BILATERAL LOW BACK PAIN WITHOUT SCIATICA: Primary | ICD-10-CM

## 2022-05-06 DIAGNOSIS — K62.89 RECTAL PAIN: ICD-10-CM

## 2022-05-06 DIAGNOSIS — R10.9 ABDOMINAL PAIN, UNSPECIFIED ABDOMINAL LOCATION: ICD-10-CM

## 2022-05-06 DIAGNOSIS — G89.4 CHRONIC PAIN DISORDER: ICD-10-CM

## 2022-05-06 DIAGNOSIS — Z79.899 OTHER LONG TERM (CURRENT) DRUG THERAPY: ICD-10-CM

## 2022-05-06 DIAGNOSIS — M54.2 NECK PAIN: ICD-10-CM

## 2022-05-06 PROCEDURE — 99213 OFFICE O/P EST LOW 20 MIN: CPT | Performed by: PHYSICAL MEDICINE & REHABILITATION

## 2022-05-06 RX ORDER — HYDROCODONE BITARTRATE AND ACETAMINOPHEN 10; 325 MG/1; MG/1
1 TABLET ORAL EVERY 4 HOURS PRN
Qty: 180 TABLET | Refills: 0 | Status: SHIPPED | OUTPATIENT
Start: 2022-05-06 | End: 2022-07-29 | Stop reason: SDUPTHER

## 2022-05-06 RX ORDER — SULFASALAZINE 500 MG/1
TABLET ORAL
COMMUNITY

## 2022-05-06 NOTE — PROGRESS NOTES
Jacy ARCOS is a 46 y.o. female.     lower abdominal pain, rectal pain, began 2012 with dz of Ulcerative Colitis, 9/10 at worst, 7/10 at best, 5/10 today, always present, varies with UC, helped only by meds, was taking Tramadol with modest benefit, no NSAIDs with UC, also takes Humira and other UC meds. X-ray pelvis w/o acute injury. CT head with sinusitis only. Prior notes reviewed, as above, referred for pain management, wants to take minimum pain meds possible. Started Norco 5/325mg QID prn, not effective, increased to 7.5/325mg QID prn, then 10/325mg QID prn with better relief, generally doing well now except during flares. Had bladder insterstim implanted with improvement in incontinence, no change to pain, stable still. Syncopal episodes, had cardiac cath. Worsening LBP, new MRI L-spine with L5/S1 listhesis 7mm, had fusion with Dr. Umaña with post-op pain, muscle spasms, controlled by Tizanidine.    Abdominal Pain  Associated symptoms include constipation, diarrhea, nausea and vomiting. Pertinent negatives include no arthralgias, fever or myalgias.        The following portions of the patient's history were reviewed and updated as appropriate: allergies, current medications, past family history, past medical history, past social history, past surgical history and problem list.    Review of Systems   Constitutional: Positive for fatigue. Negative for chills and fever.   HENT: Positive for trouble swallowing. Negative for hearing loss.    Eyes: Negative for visual disturbance.   Respiratory: Positive for shortness of breath.    Cardiovascular: Negative for chest pain.   Gastrointestinal: Positive for abdominal pain, constipation, diarrhea, nausea and vomiting.   Genitourinary: Negative for urinary incontinence.   Musculoskeletal: Positive for back pain. Negative for arthralgias, joint swelling, myalgias and neck pain.   Neurological: Positive for numbness and headache. Negative for dizziness and  weakness.       Objective   Physical Exam   Constitutional: She is oriented to person, place, and time. She appears well-developed and well-nourished.   HENT:   Head: Normocephalic and atraumatic.   Eyes: Pupils are equal, round, and reactive to light.   Cardiovascular: Normal rate, regular rhythm and normal heart sounds.   Pulmonary/Chest: Breath sounds normal.   Abdominal: Soft. Bowel sounds are normal. She exhibits no distension. There is no abdominal tenderness.   Neurological: She is alert and oriented to person, place, and time. She has normal reflexes. She displays normal reflexes. No sensory deficit.   Psychiatric: Her behavior is normal. Thought content normal.         Assessment/Plan   Diagnoses and all orders for this visit:    1. Chronic bilateral low back pain without sciatica (Primary)    2. Neck pain    3. Rectal pain    4. Abdominal pain, unspecified abdominal location    5. Chronic pain disorder    6. Other long term (current) drug therapy        INspect reviewed, in order, small amount of oxycodone from surgeon. Low risk. UDS 8/27/21 in order.  Failed Norco 5/325mg QID prn, 7.5/325mg QID prn.  Increased to Norco 10/325mg q4h prn, hopefully reduce in future, discussed need to balance pain control with adverse side effects of opioids previously. Using short-acting opioids as pain varies considerably. Was taking brand name only following severe allergic reaction after pharmacy changed generics, but using [facility] now.  Cont other meds as prescribed.  Began Tizanidine 4mg TID prn, working well, failed Baclofen, cannot tolerate Flexeril.  RTC 3 months for f/u. Uses [facility].                    Physical Exam  Constitutional:       Appearance: She is well-developed and well-nourished.   HENT:      Head: Normocephalic and atraumatic.   Eyes:      Pupils: Pupils are equal, round, and reactive to light.   Cardiovascular:      Rate and Rhythm: Normal rate and regular rhythm.       Heart sounds: Normal heart sounds.   Pulmonary:      Breath sounds: Normal breath sounds.   Abdominal:      General: Bowel sounds are normal. There is no distension.      Palpations: Abdomen is soft.      Tenderness: There is no abdominal tenderness.   Neurological:      Mental Status: She is alert and oriented to person, place, and time.      Sensory: No sensory deficit.      Deep Tendon Reflexes: Reflexes are normal and symmetric. Reflexes normal.   Psychiatric:         Behavior: Behavior normal.         Thought Content: Thought content normal.

## 2022-07-29 ENCOUNTER — OFFICE VISIT (OUTPATIENT)
Dept: PAIN MEDICINE | Facility: CLINIC | Age: 47
End: 2022-07-29

## 2022-07-29 VITALS
OXYGEN SATURATION: 97 % | RESPIRATION RATE: 16 BRPM | SYSTOLIC BLOOD PRESSURE: 117 MMHG | DIASTOLIC BLOOD PRESSURE: 84 MMHG | HEART RATE: 85 BPM

## 2022-07-29 DIAGNOSIS — Z79.899 OTHER LONG TERM (CURRENT) DRUG THERAPY: Primary | ICD-10-CM

## 2022-07-29 DIAGNOSIS — G89.4 CHRONIC PAIN DISORDER: ICD-10-CM

## 2022-07-29 DIAGNOSIS — K62.89 RECTAL PAIN: ICD-10-CM

## 2022-07-29 DIAGNOSIS — M54.50 CHRONIC BILATERAL LOW BACK PAIN WITHOUT SCIATICA: ICD-10-CM

## 2022-07-29 DIAGNOSIS — R10.9 ABDOMINAL PAIN, UNSPECIFIED ABDOMINAL LOCATION: ICD-10-CM

## 2022-07-29 DIAGNOSIS — G89.29 CHRONIC BILATERAL LOW BACK PAIN WITHOUT SCIATICA: ICD-10-CM

## 2022-07-29 DIAGNOSIS — M54.2 NECK PAIN: ICD-10-CM

## 2022-07-29 PROCEDURE — 99213 OFFICE O/P EST LOW 20 MIN: CPT | Performed by: PHYSICAL MEDICINE & REHABILITATION

## 2022-07-29 RX ORDER — HYDROCODONE BITARTRATE AND ACETAMINOPHEN 10; 325 MG/1; MG/1
1 TABLET ORAL EVERY 4 HOURS PRN
Qty: 180 TABLET | Refills: 0 | Status: SHIPPED | OUTPATIENT
Start: 2022-07-29 | End: 2022-11-04 | Stop reason: SDUPTHER

## 2022-07-29 NOTE — PROGRESS NOTES
Subjective   Megan Stafford is a 47 y.o. female.     lower abdominal pain, rectal pain, began 2012 with dz of Ulcerative Colitis, 9/10 at worst, 7/10 at best, 5/10 today, always present, varies with UC, helped only by meds, was taking Tramadol with modest benefit, no NSAIDs with UC, also takes Humira and other UC meds. X-ray pelvis w/o acute injury. CT head with sinusitis only. Prior notes reviewed, as above, referred for pain management, wants to take minimum pain meds possible. Started Norco 5/325mg QID prn, not effective, increased to 7.5/325mg QID prn, then 10/325mg QID prn with better relief, generally doing well now except during flares. Had bladder insterstim implanted with improvement in incontinence, no change to pain, stable still. Syncopal episodes, had cardiac cath. Worsening LBP, new MRI L-spine with L5/S1 listhesis 7mm, had fusion with Dr. Umaña with post-op pain, muscle spasms, controlled by Tizanidine.    Abdominal Pain  Associated symptoms include constipation, diarrhea, nausea and vomiting. Pertinent negatives include no arthralgias, fever or myalgias.        The following portions of the patient's history were reviewed and updated as appropriate: allergies, current medications, past family history, past medical history, past social history, past surgical history and problem list.    Review of Systems   Constitutional: Positive for fatigue. Negative for chills and fever.   HENT: Positive for trouble swallowing. Negative for hearing loss.    Eyes: Negative for visual disturbance.   Respiratory: Positive for shortness of breath.    Cardiovascular: Negative for chest pain.   Gastrointestinal: Positive for abdominal pain, constipation, diarrhea, nausea and vomiting.   Genitourinary: Negative for urinary incontinence.   Musculoskeletal: Positive for back pain. Negative for arthralgias, joint swelling, myalgias and neck pain.   Neurological: Positive for numbness and headache. Negative for dizziness and  weakness.       Objective   Physical Exam   Constitutional: She is oriented to person, place, and time. She appears well-developed and well-nourished.   HENT:   Head: Normocephalic and atraumatic.   Eyes: Pupils are equal, round, and reactive to light.   Cardiovascular: Normal rate, regular rhythm and normal heart sounds.   Pulmonary/Chest: Breath sounds normal.   Abdominal: Soft. Bowel sounds are normal. She exhibits no distension. There is no abdominal tenderness.   Neurological: She is alert and oriented to person, place, and time. She has normal reflexes. She displays normal reflexes. No sensory deficit.   Psychiatric: Her behavior is normal. Thought content normal.         Assessment & Plan   Diagnoses and all orders for this visit:    1. Chronic bilateral low back pain without sciatica (Primary)    2. Neck pain    3. Chronic pain disorder    4. Abdominal pain, unspecified abdominal location    5. Other long term (current) drug therapy    6. Rectal pain        INspect reviewed, in order, small amount of oxycodone from surgeon. Low risk. UDS 8/27/21 in order.  Failed Norco 5/325mg QID prn, 7.5/325mg QID prn. Increased to Norco 10/325mg q4h prn, hopefully reduce in future, discussed need to balance pain control with adverse side effects of opioids previously. Using short-acting opioids as pain varies considerably. Was taking brand name only following severe allergic reaction after pharmacy changed generics, but using [facility] now.  Cont other meds as prescribed.  Began Tizanidine 4mg TID prn, working well, failed Baclofen, cannot tolerate Flexeril.  RTC 3 months for f/u. Uses [facility] for all scripts.

## 2022-11-04 ENCOUNTER — OFFICE VISIT (OUTPATIENT)
Dept: PAIN MEDICINE | Facility: CLINIC | Age: 47
End: 2022-11-04

## 2022-11-04 VITALS
RESPIRATION RATE: 16 BRPM | SYSTOLIC BLOOD PRESSURE: 135 MMHG | HEART RATE: 84 BPM | OXYGEN SATURATION: 97 % | DIASTOLIC BLOOD PRESSURE: 69 MMHG

## 2022-11-04 DIAGNOSIS — Z79.899 OTHER LONG TERM (CURRENT) DRUG THERAPY: ICD-10-CM

## 2022-11-04 DIAGNOSIS — R10.9 ABDOMINAL PAIN, UNSPECIFIED ABDOMINAL LOCATION: ICD-10-CM

## 2022-11-04 DIAGNOSIS — G89.4 CHRONIC PAIN DISORDER: ICD-10-CM

## 2022-11-04 DIAGNOSIS — M54.2 NECK PAIN: ICD-10-CM

## 2022-11-04 DIAGNOSIS — G89.29 CHRONIC BILATERAL LOW BACK PAIN WITHOUT SCIATICA: Primary | ICD-10-CM

## 2022-11-04 DIAGNOSIS — M54.50 CHRONIC BILATERAL LOW BACK PAIN WITHOUT SCIATICA: Primary | ICD-10-CM

## 2022-11-04 PROCEDURE — 99213 OFFICE O/P EST LOW 20 MIN: CPT | Performed by: PHYSICAL MEDICINE & REHABILITATION

## 2022-11-04 RX ORDER — HYDROCODONE BITARTRATE AND ACETAMINOPHEN 10; 325 MG/1; MG/1
1 TABLET ORAL EVERY 4 HOURS PRN
Qty: 180 TABLET | Refills: 0 | Status: SHIPPED | OUTPATIENT
Start: 2022-11-04 | End: 2023-01-04 | Stop reason: SDUPTHER

## 2022-11-04 RX ORDER — HYDROCODONE BITARTRATE AND ACETAMINOPHEN 10; 325 MG/1; MG/1
1 TABLET ORAL EVERY 4 HOURS PRN
Qty: 180 TABLET | Refills: 0 | Status: SHIPPED | OUTPATIENT
Start: 2022-11-04 | End: 2023-02-02 | Stop reason: SDUPTHER

## 2022-11-04 NOTE — PROGRESS NOTES
Subjective   Megan Stafford is a 47 y.o. female.     History of Present Illness  lower abdominal pain, rectal pain, began 2012 with dz of Ulcerative Colitis, 9/10 at worst, 7/10 at best, 5/10 today, always present, varies with UC, helped only by meds, was taking Tramadol with modest benefit, no NSAIDs with UC, also takes Humira and other UC meds. X-ray pelvis w/o acute injury. CT head with sinusitis only. Prior notes reviewed, as above, referred for pain management, wants to take minimum pain meds possible. Started Norco 5/325mg QID prn, not effective, increased to 7.5/325mg QID prn, then 10/325mg QID prn with better relief, generally doing well now except during flares. Had bladder insterstim implanted with improvement in incontinence, no change to pain, stable still. Syncopal episodes, had cardiac cath. Worsening LBP, new MRI L-spine with L5/S1 listhesis 7mm, had fusion with Dr. Umaña with post-op pain, muscle spasms, controlled by Tizanidine.  Abdominal Pain  Associated symptoms include constipation, diarrhea, nausea and vomiting. Pertinent negatives include no arthralgias, fever or myalgias.        The following portions of the patient's history were reviewed and updated as appropriate: allergies, current medications, past family history, past medical history, past social history, past surgical history and problem list.    Review of Systems   Constitutional: Positive for fatigue. Negative for chills and fever.   HENT: Positive for trouble swallowing. Negative for hearing loss.    Eyes: Negative for visual disturbance.   Respiratory: Positive for shortness of breath.    Cardiovascular: Negative for chest pain.   Gastrointestinal: Positive for abdominal pain, constipation, diarrhea, nausea and vomiting.   Genitourinary: Negative for urinary incontinence.   Musculoskeletal: Positive for back pain. Negative for arthralgias, joint swelling, myalgias and neck pain.   Neurological: Positive for numbness and headache.  "Negative for dizziness and weakness.       Objective   Physical Exam   Constitutional: She is oriented to person, place, and time. She appears well-developed and well-nourished.   HENT:   Head: Normocephalic and atraumatic.   Eyes: Pupils are equal, round, and reactive to light.   Cardiovascular: Normal rate, regular rhythm and normal heart sounds.   Pulmonary/Chest: Breath sounds normal.   Abdominal: Soft. Bowel sounds are normal. She exhibits no distension. There is no abdominal tenderness.   Neurological: She is alert and oriented to person, place, and time. She has normal reflexes. She displays normal reflexes. No sensory deficit.   Psychiatric: Her behavior is normal. Thought content normal.         Assessment & Plan   Diagnoses and all orders for this visit:    1. Chronic bilateral low back pain without sciatica (Primary)    2. Neck pain    3. Abdominal pain, unspecified abdominal location    4. Chronic pain disorder    5. Other long term (current) drug therapy        INspect reviewed, in order, small amount of oxycodone from surgeon. Low risk. UDS 7/30/22 in order. \"Megan Ernie\" on Inspect. Filled 10/6/22.   Failed Norco 5/325mg QID prn, 7.5/325mg QID prn. Increased to Norco 10/325mg q4h prn, hopefully reduce in future, discussed need to balance pain control with adverse side effects of opioids previously. Using short-acting opioids as pain varies considerably. Was taking brand name only following severe allergic reaction after pharmacy changed generics, but using [facility] now.  Cont other meds as prescribed.  Began Tizanidine 4mg TID prn, working well, failed Baclofen, cannot tolerate Flexeril.  RTC 3 months for f/u. Uses [facility] for all scripts.                    Physical Exam  Constitutional:       Appearance: She is well-developed and well-nourished.   HENT:      Head: Normocephalic and atraumatic.   Eyes:      Pupils: Pupils are equal, round, and reactive to light. "   Cardiovascular:      Rate and Rhythm: Normal rate and regular rhythm.      Heart sounds: Normal heart sounds.   Pulmonary:      Breath sounds: Normal breath sounds.   Abdominal:      General: Bowel sounds are normal. There is no distension.      Palpations: Abdomen is soft.      Tenderness: There is no abdominal tenderness.   Neurological:      Mental Status: She is alert and oriented to person, place, and time.      Sensory: No sensory deficit.      Deep Tendon Reflexes: Reflexes are normal and symmetric. Reflexes normal.   Psychiatric:         Behavior: Behavior normal.         Thought Content: Thought content normal.

## 2023-01-04 ENCOUNTER — TELEPHONE (OUTPATIENT)
Dept: PAIN MEDICINE | Facility: CLINIC | Age: 48
End: 2023-01-04
Payer: MEDICARE

## 2023-01-04 DIAGNOSIS — M54.50 CHRONIC BILATERAL LOW BACK PAIN WITHOUT SCIATICA: ICD-10-CM

## 2023-01-04 DIAGNOSIS — G89.29 CHRONIC BILATERAL LOW BACK PAIN WITHOUT SCIATICA: ICD-10-CM

## 2023-01-04 NOTE — TELEPHONE ENCOUNTER
1/4/23-- DR BRADSHAW-- PT LEFT VM DOES NOT HAVE [facility] INS AS OF 1/2023--- NEEDS IT SENT TO [facility]([phone]) FOR HER LAST HYDROCODONE ACET ---- ( due 1/4/23 )-- pt call back # for questions is [phone] ( pt last name Nydia -- inspect is  Isac)

## 2023-01-05 RX ORDER — HYDROCODONE BITARTRATE AND ACETAMINOPHEN 10; 325 MG/1; MG/1
1 TABLET ORAL EVERY 4 HOURS PRN
Qty: 180 TABLET | Refills: 0 | Status: SHIPPED | OUTPATIENT
Start: 2023-01-05 | End: 2023-02-02 | Stop reason: SDUPTHER

## 2023-02-02 ENCOUNTER — OFFICE VISIT (OUTPATIENT)
Dept: PAIN MEDICINE | Facility: CLINIC | Age: 48
End: 2023-02-02
Payer: MEDICARE

## 2023-02-02 VITALS
OXYGEN SATURATION: 97 % | HEART RATE: 84 BPM | DIASTOLIC BLOOD PRESSURE: 82 MMHG | RESPIRATION RATE: 16 BRPM | SYSTOLIC BLOOD PRESSURE: 137 MMHG

## 2023-02-02 DIAGNOSIS — M54.2 NECK PAIN: ICD-10-CM

## 2023-02-02 DIAGNOSIS — G89.29 CHRONIC BILATERAL LOW BACK PAIN WITHOUT SCIATICA: Primary | ICD-10-CM

## 2023-02-02 DIAGNOSIS — G89.4 CHRONIC PAIN DISORDER: ICD-10-CM

## 2023-02-02 DIAGNOSIS — M54.50 CHRONIC BILATERAL LOW BACK PAIN WITHOUT SCIATICA: Primary | ICD-10-CM

## 2023-02-02 DIAGNOSIS — Z79.899 OTHER LONG TERM (CURRENT) DRUG THERAPY: ICD-10-CM

## 2023-02-02 PROCEDURE — 99213 OFFICE O/P EST LOW 20 MIN: CPT | Performed by: PHYSICAL MEDICINE & REHABILITATION

## 2023-02-02 RX ORDER — HYDROCODONE BITARTRATE AND ACETAMINOPHEN 10; 325 MG/1; MG/1
1 TABLET ORAL EVERY 4 HOURS PRN
Qty: 180 TABLET | Refills: 0 | Status: SHIPPED | OUTPATIENT
Start: 2023-02-02 | End: 2023-03-06 | Stop reason: SDUPTHER

## 2023-02-02 RX ORDER — FAMOTIDINE 40 MG/1
40 TABLET, FILM COATED ORAL 2 TIMES DAILY
COMMUNITY

## 2023-02-02 RX ORDER — HYDROXYZINE HYDROCHLORIDE 25 MG/1
25 TABLET, FILM COATED ORAL 3 TIMES DAILY PRN
COMMUNITY

## 2023-02-02 RX ORDER — HYDROCODONE BITARTRATE AND ACETAMINOPHEN 10; 325 MG/1; MG/1
1 TABLET ORAL EVERY 4 HOURS PRN
Qty: 180 TABLET | Refills: 0 | Status: SHIPPED | OUTPATIENT
Start: 2023-02-02

## 2023-02-02 RX ORDER — BREXPIPRAZOLE 0.5 MG/1
0.5 TABLET ORAL DAILY
COMMUNITY

## 2023-02-02 NOTE — PROGRESS NOTES
Subjective   Megan Stafford is a 47 y.o. female.     History of Present Illness  lower abdominal pain, rectal pain, began 2012 with dz of Ulcerative Colitis, 9/10 at worst, 7/10 at best, 5/10 today, always present, varies with UC, helped only by meds, was taking Tramadol with modest benefit, no NSAIDs with UC, also takes Humira and other UC meds. X-ray pelvis w/o acute injury. CT head with sinusitis only. Prior notes reviewed, as above, referred for pain management, wants to take minimum pain meds possible. Started Norco 5/325mg QID prn, not effective, increased to 7.5/325mg QID prn, then 10/325mg QID prn with better relief, generally doing well now except during flares. Had bladder insterstim implanted with improvement in incontinence, no change to pain, stable still. Syncopal episodes, had cardiac cath. Worsening LBP, new MRI L-spine with L5/S1 listhesis 7mm, had fusion with Dr. Umaña with post-op pain, muscle spasms, controlled by Tizanidine.  Abdominal Pain  Associated symptoms include constipation, diarrhea, nausea and vomiting. Pertinent negatives include no arthralgias, fever or myalgias.        The following portions of the patient's history were reviewed and updated as appropriate: allergies, current medications, past family history, past medical history, past social history, past surgical history and problem list.    Review of Systems   Constitutional: Positive for fatigue. Negative for chills and fever.   HENT: Positive for trouble swallowing. Negative for hearing loss.    Eyes: Negative for visual disturbance.   Respiratory: Positive for shortness of breath.    Cardiovascular: Negative for chest pain.   Gastrointestinal: Positive for abdominal pain, constipation, diarrhea, nausea and vomiting.   Genitourinary: Negative for urinary incontinence.   Musculoskeletal: Positive for back pain. Negative for arthralgias, joint swelling, myalgias and neck pain.   Neurological: Positive for numbness and headache.  "Negative for dizziness and weakness.       Objective   Physical Exam   Constitutional: She is oriented to person, place, and time. She appears well-developed and well-nourished.   HENT:   Head: Normocephalic and atraumatic.   Eyes: Pupils are equal, round, and reactive to light.   Cardiovascular: Normal rate, regular rhythm and normal heart sounds.   Pulmonary/Chest: Breath sounds normal.   Abdominal: Soft. Bowel sounds are normal. She exhibits no distension. There is no abdominal tenderness.   Neurological: She is alert and oriented to person, place, and time. She has normal reflexes. She displays normal reflexes. No sensory deficit.   Psychiatric: Her behavior is normal. Thought content normal.         Assessment & Plan   Diagnoses and all orders for this visit:    1. Chronic bilateral low back pain without sciatica (Primary)    2. Neck pain    3. Chronic pain disorder    4. Other long term (current) drug therapy        INspect reviewed, in order, small amount of oxycodone from surgeon. Low risk. UDS 7/30/22 in order. \"Megan Ernie\" on Inspect. Filled 10/6/22.   Failed Norco 5/325mg QID prn, 7.5/325mg QID prn. Increased to Norco 10/325mg q4h prn, hopefully reduce in future, discussed need to balance pain control with adverse side effects of opioids previously. Using short-acting opioids as pain varies considerably. Was taking brand name only following severe allergic reaction after pharmacy changed generics, but using [facility] now.  Cont other meds as prescribed.  Began Tizanidine 4mg TID prn, working well, failed Baclofen, cannot tolerate Flexeril.  RTC 3 months for f/u. Uses [facility] for all scripts.                    Physical Exam  Constitutional:       Appearance: She is well-developed and well-nourished.   HENT:      Head: Normocephalic and atraumatic.   Eyes:      Pupils: Pupils are equal, round, and reactive to light.   Cardiovascular:      Rate and Rhythm: Normal rate and regular " rhythm.      Heart sounds: Normal heart sounds.   Pulmonary:      Breath sounds: Normal breath sounds.   Abdominal:      General: Bowel sounds are normal. There is no distension.      Palpations: Abdomen is soft.      Tenderness: There is no abdominal tenderness.   Neurological:      Mental Status: She is alert and oriented to person, place, and time.      Sensory: No sensory deficit.      Deep Tendon Reflexes: Reflexes are normal and symmetric. Reflexes normal.   Psychiatric:         Behavior: Behavior normal.         Thought Content: Thought content normal.

## 2023-03-06 ENCOUNTER — TELEPHONE (OUTPATIENT)
Dept: PAIN MEDICINE | Facility: CLINIC | Age: 48
End: 2023-03-06
Payer: MEDICARE

## 2023-03-06 DIAGNOSIS — M54.50 CHRONIC BILATERAL LOW BACK PAIN WITHOUT SCIATICA: ICD-10-CM

## 2023-03-06 DIAGNOSIS — G89.29 CHRONIC BILATERAL LOW BACK PAIN WITHOUT SCIATICA: ICD-10-CM

## 2023-03-06 RX ORDER — HYDROCODONE BITARTRATE AND ACETAMINOPHEN 10; 325 MG/1; MG/1
1 TABLET ORAL EVERY 4 HOURS PRN
Qty: 180 TABLET | Refills: 0 | Status: SHIPPED | OUTPATIENT
Start: 2023-03-06

## 2023-03-06 NOTE — TELEPHONE ENCOUNTER
3/6/23-- Dr BRADSHAW-- pt wants hydrocodone rx sent to [facility] instead of [facility]-- I called [facility] they had both rxes there , pt wanted me to cxcl them there so I did-- please send to [facility]  Delivery now instead-- inspect in chart

## 2023-04-19 DIAGNOSIS — M54.50 CHRONIC BILATERAL LOW BACK PAIN WITHOUT SCIATICA: ICD-10-CM

## 2023-04-19 DIAGNOSIS — G89.29 CHRONIC BILATERAL LOW BACK PAIN WITHOUT SCIATICA: ICD-10-CM

## 2023-04-19 RX ORDER — HYDROCODONE BITARTRATE AND ACETAMINOPHEN 10; 325 MG/1; MG/1
1 TABLET ORAL EVERY 4 HOURS PRN
Qty: 180 TABLET | Refills: 0 | Status: SHIPPED | OUTPATIENT
Start: 2023-04-19

## 2023-04-24 ENCOUNTER — TELEPHONE (OUTPATIENT)
Dept: PAIN MEDICINE | Facility: CLINIC | Age: 48
End: 2023-04-24
Payer: MEDICARE

## 2023-05-04 ENCOUNTER — OFFICE VISIT (OUTPATIENT)
Dept: PAIN MEDICINE | Facility: CLINIC | Age: 48
End: 2023-05-04
Payer: MEDICARE

## 2023-05-04 VITALS
RESPIRATION RATE: 16 BRPM | HEART RATE: 77 BPM | OXYGEN SATURATION: 97 % | SYSTOLIC BLOOD PRESSURE: 122 MMHG | DIASTOLIC BLOOD PRESSURE: 77 MMHG

## 2023-05-04 DIAGNOSIS — M54.2 NECK PAIN: ICD-10-CM

## 2023-05-04 DIAGNOSIS — G89.29 CHRONIC BILATERAL LOW BACK PAIN WITHOUT SCIATICA: ICD-10-CM

## 2023-05-04 DIAGNOSIS — Z79.899 HIGH RISK MEDICATION USE: Primary | ICD-10-CM

## 2023-05-04 DIAGNOSIS — R10.9 ABDOMINAL PAIN, UNSPECIFIED ABDOMINAL LOCATION: ICD-10-CM

## 2023-05-04 DIAGNOSIS — G89.4 CHRONIC PAIN DISORDER: ICD-10-CM

## 2023-05-04 DIAGNOSIS — Z79.899 OTHER LONG TERM (CURRENT) DRUG THERAPY: ICD-10-CM

## 2023-05-04 DIAGNOSIS — M54.50 CHRONIC BILATERAL LOW BACK PAIN WITHOUT SCIATICA: ICD-10-CM

## 2023-05-04 DIAGNOSIS — K62.89 RECTAL PAIN: ICD-10-CM

## 2023-05-04 RX ORDER — SEMAGLUTIDE 0.5 MG/.5ML
INJECTION, SOLUTION SUBCUTANEOUS
COMMUNITY

## 2023-05-04 RX ORDER — HYDROCODONE BITARTRATE AND ACETAMINOPHEN 10; 325 MG/1; MG/1
1 TABLET ORAL EVERY 4 HOURS PRN
Qty: 180 TABLET | Refills: 0 | Status: SHIPPED | OUTPATIENT
Start: 2023-05-04

## 2023-05-04 RX ORDER — OMEPRAZOLE 40 MG/1
1 CAPSULE, DELAYED RELEASE ORAL 2 TIMES DAILY
COMMUNITY

## 2023-05-04 RX ORDER — TIZANIDINE 4 MG/1
6 TABLET ORAL EVERY 8 HOURS PRN
Qty: 1355 TABLET | Refills: 11 | Status: SHIPPED | OUTPATIENT
Start: 2023-05-04

## 2023-05-04 RX ORDER — LEVOTHYROXINE SODIUM 0.05 MG/1
TABLET ORAL
COMMUNITY
Start: 2023-02-13

## 2023-05-04 NOTE — PROGRESS NOTES
Subjective   Megan Stafford is a 47 y.o. female.     History of Present Illness  lower abdominal pain, rectal pain, began 2012 with dz of Ulcerative Colitis, 9/10 at worst, 7/10 at best, always present, varies with UC, helped only by meds, was taking Tramadol with modest benefit, no NSAIDs with UC, also takes Humira and other UC meds. X-ray pelvis w/o acute injury. CT head with sinusitis only. Prior notes reviewed, as above, referred for pain management, wants to take minimum pain meds possible. Started Norco 5/325mg QID prn, not effective, increased to 7.5/325mg QID prn, then 10/325mg QID prn with better relief, generally doing well now except during flares. Had bladder insterstim implanted with improvement in incontinence, no change to pain, stable still. Syncopal episodes, had cardiac cath. Worsening LBP, new MRI L-spine with L5/S1 listhesis 7mm, had fusion with Dr. Umaña with post-op pain, muscle spasms, controlled by Tizanidine.  Abdominal Pain  Associated symptoms include constipation, diarrhea, nausea and vomiting. Pertinent negatives include no arthralgias, fever or myalgias.        The following portions of the patient's history were reviewed and updated as appropriate: allergies, current medications, past family history, past medical history, past social history, past surgical history and problem list.    Review of Systems   Constitutional: Positive for fatigue. Negative for chills and fever.   HENT: Positive for trouble swallowing. Negative for hearing loss.    Eyes: Negative for visual disturbance.   Respiratory: Positive for shortness of breath.    Cardiovascular: Negative for chest pain.   Gastrointestinal: Positive for abdominal pain, constipation, diarrhea, nausea and vomiting.   Genitourinary: Negative for urinary incontinence.   Musculoskeletal: Positive for back pain. Negative for arthralgias, joint swelling, myalgias and neck pain.   Neurological: Positive for numbness and headache. Negative for  "dizziness and weakness.       Objective   Physical Exam   Constitutional: She is oriented to person, place, and time. She appears well-developed and well-nourished.   HENT:   Head: Normocephalic and atraumatic.   Eyes: Pupils are equal, round, and reactive to light.   Cardiovascular: Normal rate, regular rhythm and normal heart sounds.   Pulmonary/Chest: Breath sounds normal.   Abdominal: Soft. Bowel sounds are normal. She exhibits no distension. There is no abdominal tenderness.   Neurological: She is alert and oriented to person, place, and time. She has normal reflexes. She displays normal reflexes. No sensory deficit.   Psychiatric: Her behavior is normal. Thought content normal.         Assessment & Plan   Diagnoses and all orders for this visit:    1. Chronic bilateral low back pain without sciatica (Primary)    2. Neck pain    3. Abdominal pain, unspecified abdominal location    4. Chronic pain disorder    5. Other long term (current) drug therapy    6. Rectal pain        INspect reviewed, in order, small amount of oxycodone from surgeon. Low risk. UDS 7/30/22 in order. \"Megan Ernie\" on Inspect.   Failed Norco 5/325mg QID prn, 7.5/325mg QID prn. Increased to Norco 10/325mg q4h prn, hopefully reduce in future, discussed need to balance pain control with adverse side effects of opioids previously. Using short-acting opioids as pain varies considerably. Was taking brand name only following severe allergic reaction after pharmacy changed generics, but using [facility] now.  Cont other meds as prescribed.  Began Tizanidine 4mg TID prn, working well, increase to 6mg TID prn, failed Baclofen, cannot tolerate Flexeril.  RTC 3 months for f/u. Uses [facility] for all scripts, can only send 1 month of opioids at a time.                    "

## 2023-07-31 ENCOUNTER — DOCUMENTATION (OUTPATIENT)
Dept: PAIN MEDICINE | Facility: CLINIC | Age: 48
End: 2023-07-31
Payer: MEDICARE

## 2023-08-03 ENCOUNTER — OFFICE VISIT (OUTPATIENT)
Dept: PAIN MEDICINE | Facility: CLINIC | Age: 48
End: 2023-08-03
Payer: MEDICARE

## 2023-08-03 VITALS
SYSTOLIC BLOOD PRESSURE: 92 MMHG | OXYGEN SATURATION: 100 % | RESPIRATION RATE: 16 BRPM | DIASTOLIC BLOOD PRESSURE: 63 MMHG | HEART RATE: 64 BPM

## 2023-08-03 DIAGNOSIS — G89.4 CHRONIC PAIN DISORDER: ICD-10-CM

## 2023-08-03 DIAGNOSIS — K62.89 RECTAL PAIN: ICD-10-CM

## 2023-08-03 DIAGNOSIS — M54.50 CHRONIC BILATERAL LOW BACK PAIN WITHOUT SCIATICA: Primary | ICD-10-CM

## 2023-08-03 DIAGNOSIS — Z79.899 HIGH RISK MEDICATION USE: Primary | ICD-10-CM

## 2023-08-03 DIAGNOSIS — G89.29 CHRONIC BILATERAL LOW BACK PAIN WITHOUT SCIATICA: Primary | ICD-10-CM

## 2023-08-03 DIAGNOSIS — R10.9 ABDOMINAL PAIN, UNSPECIFIED ABDOMINAL LOCATION: ICD-10-CM

## 2023-08-03 DIAGNOSIS — Z79.899 OTHER LONG TERM (CURRENT) DRUG THERAPY: ICD-10-CM

## 2023-08-03 DIAGNOSIS — M54.2 NECK PAIN: ICD-10-CM

## 2023-08-03 RX ORDER — HYDROCODONE BITARTRATE AND ACETAMINOPHEN 10; 325 MG/1; MG/1
1 TABLET ORAL EVERY 4 HOURS PRN
Qty: 180 TABLET | Refills: 0 | Status: SHIPPED | OUTPATIENT
Start: 2023-08-03 | End: 2023-09-02

## 2023-08-03 RX ORDER — HYDROCODONE BITARTRATE AND ACETAMINOPHEN 10; 325 MG/1; MG/1
1 TABLET ORAL EVERY 4 HOURS PRN
Qty: 180 TABLET | Refills: 0 | Status: SHIPPED | OUTPATIENT
Start: 2023-08-03

## 2023-09-22 ENCOUNTER — TELEPHONE (OUTPATIENT)
Dept: PAIN MEDICINE | Facility: CLINIC | Age: 48
End: 2023-09-22

## 2023-09-22 DIAGNOSIS — M54.50 CHRONIC BILATERAL LOW BACK PAIN WITHOUT SCIATICA: ICD-10-CM

## 2023-09-22 DIAGNOSIS — G89.29 CHRONIC BILATERAL LOW BACK PAIN WITHOUT SCIATICA: ICD-10-CM

## 2023-09-22 RX ORDER — HYDROCODONE BITARTRATE AND ACETAMINOPHEN 10; 325 MG/1; MG/1
1 TABLET ORAL EVERY 4 HOURS PRN
Qty: 180 TABLET | Refills: 0 | Status: SHIPPED | OUTPATIENT
Start: 2023-09-22

## 2023-09-22 NOTE — TELEPHONE ENCOUNTER
Patient get Hydrocodone delivered states it takes 7-10 days to get it to her house from mail order pharmacy. She wants to know if you will go ahead and send this in without the DNF so it can get started?

## 2023-10-20 ENCOUNTER — TRANSCRIBE ORDERS (OUTPATIENT)
Dept: PSYCHIATRY | Facility: CLINIC | Age: 48
End: 2023-10-20
Payer: MEDICARE

## 2023-10-20 DIAGNOSIS — F41.1 GENERALIZED ANXIETY DISORDER: Primary | ICD-10-CM

## 2023-10-26 ENCOUNTER — OFFICE VISIT (OUTPATIENT)
Dept: PAIN MEDICINE | Facility: CLINIC | Age: 48
End: 2023-10-26
Payer: MEDICARE

## 2023-10-26 VITALS
DIASTOLIC BLOOD PRESSURE: 66 MMHG | HEART RATE: 90 BPM | OXYGEN SATURATION: 98 % | RESPIRATION RATE: 16 BRPM | SYSTOLIC BLOOD PRESSURE: 104 MMHG

## 2023-10-26 DIAGNOSIS — Z79.899 OTHER LONG TERM (CURRENT) DRUG THERAPY: ICD-10-CM

## 2023-10-26 DIAGNOSIS — G89.4 CHRONIC PAIN DISORDER: ICD-10-CM

## 2023-10-26 DIAGNOSIS — K62.89 RECTAL PAIN: ICD-10-CM

## 2023-10-26 DIAGNOSIS — M54.50 CHRONIC BILATERAL LOW BACK PAIN WITHOUT SCIATICA: ICD-10-CM

## 2023-10-26 DIAGNOSIS — G89.29 CHRONIC BILATERAL LOW BACK PAIN WITHOUT SCIATICA: ICD-10-CM

## 2023-10-26 DIAGNOSIS — R10.9 ABDOMINAL PAIN, UNSPECIFIED ABDOMINAL LOCATION: ICD-10-CM

## 2023-10-26 DIAGNOSIS — M54.2 NECK PAIN: Primary | ICD-10-CM

## 2023-10-26 RX ORDER — BUPRENORPHINE 5 UG/H
1 PATCH TRANSDERMAL WEEKLY
Qty: 4 PATCH | Refills: 2 | Status: SHIPPED | OUTPATIENT
Start: 2023-10-26

## 2023-10-26 NOTE — PROGRESS NOTES
Subjective   Megan Stafford is a 48 y.o. female.     History of Present Illness  lower abdominal pain, rectal pain, began 2012 with dz of Ulcerative Colitis, 9/10 at worst, 7/10 at best, always present, varies with UC, helped only by meds, was taking Tramadol with modest benefit, no NSAIDs with UC, also takes Humira and other UC meds. X-ray pelvis w/o acute injury. CT head with sinusitis only. Prior notes reviewed, as above, referred for pain management, wants to take minimum pain meds possible. Started Norco 5/325mg QID prn, not effective, increased to 7.5/325mg QID prn, then 10/325mg QID prn with better relief, generally doing well now except during flares. Had bladder insterstim implanted with improvement in incontinence, no change to pain, stable still. Syncopal episodes, had cardiac cath. Worsening LBP, new MRI L-spine with L5/S1 listhesis 7mm, had fusion with Dr. Umaña with post-op pain, muscle spasms, controlled by Tizanidine.  Back Pain  Associated symptoms include abdominal pain and numbness. Pertinent negatives include no bladder incontinence, chest pain, fever or weakness.   Abdominal Pain  Associated symptoms include constipation, diarrhea, nausea and vomiting. Pertinent negatives include no arthralgias, fever or myalgias.        The following portions of the patient's history were reviewed and updated as appropriate: allergies, current medications, past family history, past medical history, past social history, past surgical history and problem list.    Review of Systems   Constitutional:  Positive for fatigue. Negative for chills and fever.   HENT:  Positive for trouble swallowing. Negative for hearing loss.    Eyes:  Negative for visual disturbance.   Respiratory:  Positive for shortness of breath.    Cardiovascular:  Negative for chest pain.   Gastrointestinal:  Positive for abdominal pain, constipation, diarrhea, nausea and vomiting.   Genitourinary:  Negative for urinary incontinence.  "  Musculoskeletal:  Positive for back pain. Negative for arthralgias, joint swelling, myalgias and neck pain.   Neurological:  Positive for numbness and headache. Negative for dizziness and weakness.       Objective   Physical Exam   Constitutional: She is oriented to person, place, and time. She appears well-developed and well-nourished.   HENT:   Head: Normocephalic and atraumatic.   Eyes: Pupils are equal, round, and reactive to light.   Cardiovascular: Normal rate, regular rhythm and normal heart sounds.   Pulmonary/Chest: Breath sounds normal.   Abdominal: Soft. Bowel sounds are normal. She exhibits no distension. There is no abdominal tenderness.   Neurological: She is alert and oriented to person, place, and time. She has normal reflexes. She displays normal reflexes. No sensory deficit.   Psychiatric: Her behavior is normal. Thought content normal.         Assessment & Plan   Diagnoses and all orders for this visit:    1. Neck pain (Primary)    2. Chronic pain disorder    3. Chronic bilateral low back pain without sciatica    4. Abdominal pain, unspecified abdominal location    5. Other long term (current) drug therapy    6. Rectal pain        INspect reviewed, in order, small amount of oxycodone from surgeon. Low risk. UDS 7/30/22 in order. \"Megan Ernie\" on Inspect.   Failed Norco 5/325mg QID prn, 7.5/325mg QID prn. Increased to Norco 10/325mg q4h prn, hopefully reduce in future, discussed need to balance pain control with adverse side effects of opioids previously. Using short-acting opioids as pain varies considerably. Was taking brand name only following severe allergic reaction after pharmacy changed generics, but using [facility] now. Filled 9/25/23. Stopped when she began Klonopin. Begin Butrans 5mcg/hr q7d to minimize risk of respiratory depression.  Cont other meds as prescribed.  Began Tizanidine 4mg TID prn, working well, increased to 6mg TID prn, failed Baclofen, cannot tolerate " Flexeril.  RTC 3 months for f/u. Uses [facility] for all scripts, can only send 1 month of opioids at a time.

## 2023-10-27 ENCOUNTER — TELEPHONE (OUTPATIENT)
Dept: PAIN MEDICINE | Facility: CLINIC | Age: 48
End: 2023-10-27

## 2023-10-27 NOTE — TELEPHONE ENCOUNTER
Patients pharmacy is asking if you taking patient off of Hydrocodone. He sates they will not fill 180 Hydrocodone with the Butrans patch's they will fill one or the other. He states this is to much pain meds for this patient.

## 2023-10-27 NOTE — TELEPHONE ENCOUNTER
Did she try to fill the Norco? I switched her from [place] to Butrans to minimize the risk of respiratory depression since she had started klonopin. She shouldn't be on Norco anymore. Please let me know what the pharmacist says.

## 2023-10-27 NOTE — TELEPHONE ENCOUNTER
Spoke to pharmacist No, she did not try to fill Rx he just wanted to make sure she was not taking both.

## 2023-10-30 ENCOUNTER — TELEPHONE (OUTPATIENT)
Dept: PAIN MEDICINE | Facility: CLINIC | Age: 48
End: 2023-10-30

## 2023-10-30 RX ORDER — NALOXONE HYDROCHLORIDE 4 MG/.1ML
1 SPRAY NASAL AS NEEDED
Qty: 2 EACH | Refills: 0 | Status: SHIPPED | OUTPATIENT
Start: 2023-10-30

## 2023-11-03 ENCOUNTER — TELEPHONE (OUTPATIENT)
Dept: PAIN MEDICINE | Facility: CLINIC | Age: 48
End: 2023-11-03
Payer: MEDICARE

## 2023-11-03 RX ORDER — OXYCODONE HYDROCHLORIDE AND ACETAMINOPHEN 5; 325 MG/1; MG/1
1 TABLET ORAL EVERY 6 HOURS PRN
Qty: 28 TABLET | Refills: 0 | Status: SHIPPED | OUTPATIENT
Start: 2023-11-03 | End: 2023-11-16 | Stop reason: SDUPTHER

## 2023-11-03 NOTE — TELEPHONE ENCOUNTER
I will send in a week's worth of Percocet, but keep the dosage lower since she has started a benzo. PDMP reviewed, sent, thanks.

## 2023-11-03 NOTE — TELEPHONE ENCOUNTER
Dr. Zee  will be doing hernia surgery on Monday and wants to know if you will be handling her post op pain. Spoke with her nurse melody and she said he won't be giving her anything for pain.

## 2023-11-16 RX ORDER — OXYCODONE HYDROCHLORIDE AND ACETAMINOPHEN 5; 325 MG/1; MG/1
1 TABLET ORAL EVERY 6 HOURS PRN
Qty: 28 TABLET | Refills: 0 | Status: SHIPPED | OUTPATIENT
Start: 2023-11-16

## 2023-11-16 NOTE — TELEPHONE ENCOUNTER
11/16/23-- called pt --  she never got a mailorder rx from [facility] mail order  for the 11/3 fill

## 2024-01-05 ENCOUNTER — PATIENT ROUNDING (BHMG ONLY) (OUTPATIENT)
Dept: PSYCHIATRY | Facility: CLINIC | Age: 49
End: 2024-01-05
Payer: MEDICARE

## 2024-01-05 ENCOUNTER — OFFICE VISIT (OUTPATIENT)
Dept: PSYCHIATRY | Facility: CLINIC | Age: 49
End: 2024-01-05
Payer: MEDICARE

## 2024-01-05 DIAGNOSIS — F43.12 CHRONIC POSTTRAUMATIC STRESS DISORDER: Chronic | ICD-10-CM

## 2024-01-05 DIAGNOSIS — F41.1 GENERALIZED ANXIETY DISORDER: Chronic | ICD-10-CM

## 2024-01-05 DIAGNOSIS — F51.05 INSOMNIA DUE TO OTHER MENTAL DISORDER: ICD-10-CM

## 2024-01-05 DIAGNOSIS — F33.2 SEVERE RECURRENT MAJOR DEPRESSION WITHOUT PSYCHOTIC FEATURES: Primary | Chronic | ICD-10-CM

## 2024-01-05 DIAGNOSIS — F99 INSOMNIA DUE TO OTHER MENTAL DISORDER: ICD-10-CM

## 2024-01-05 RX ORDER — CLONAZEPAM 1 MG/1
1 TABLET ORAL 2 TIMES DAILY PRN
Qty: 60 TABLET | Refills: 1 | Status: SHIPPED | OUTPATIENT
Start: 2024-01-05

## 2024-01-05 RX ORDER — ZOLPIDEM TARTRATE 10 MG/1
10 TABLET ORAL
Qty: 30 TABLET | Refills: 1 | Status: SHIPPED | OUTPATIENT
Start: 2024-01-05

## 2024-01-05 RX ORDER — GLYCOPYRROLATE 1 MG/1
1 TABLET ORAL 3 TIMES DAILY
COMMUNITY

## 2024-01-05 RX ORDER — MULTIPLE VITAMINS W/ MINERALS TAB 9MG-400MCG
1 TAB ORAL DAILY
COMMUNITY
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
## 2024-01-05 NOTE — PROGRESS NOTES
A My-chart message has been sent to the patient for PATIENT ROUNDING with [facility].

## 2024-01-15 ENCOUNTER — PRIOR AUTHORIZATION (OUTPATIENT)
Dept: PSYCHIATRY | Facility: CLINIC | Age: 49
End: 2024-01-15
Payer: MEDICARE

## 2024-01-15 NOTE — TELEPHONE ENCOUNTER
"PA for Rexulti 1 mg requested by [facility] pharmacy.  Submitted to [employer] form.      Pharmacy msg: preferred alternatives are abilify asimtufii 720 mg suspension, XR, IM syringe, aripiprazole 10 mg tablets, aristada 441 mg syringe      Response: \"Available without authorization.\"  "

## 2024-01-18 ENCOUNTER — OFFICE VISIT (OUTPATIENT)
Dept: PAIN MEDICINE | Facility: CLINIC | Age: 49
End: 2024-01-18
Payer: MEDICARE

## 2024-01-18 VITALS
HEART RATE: 82 BPM | RESPIRATION RATE: 16 BRPM | SYSTOLIC BLOOD PRESSURE: 132 MMHG | OXYGEN SATURATION: 96 % | WEIGHT: 199.3 LBS | DIASTOLIC BLOOD PRESSURE: 79 MMHG | BODY MASS INDEX: 34.21 KG/M2

## 2024-01-18 DIAGNOSIS — G89.29 CHRONIC BILATERAL LOW BACK PAIN WITHOUT SCIATICA: Primary | ICD-10-CM

## 2024-01-18 DIAGNOSIS — K62.89 RECTAL PAIN: ICD-10-CM

## 2024-01-18 DIAGNOSIS — G89.4 CHRONIC PAIN DISORDER: ICD-10-CM

## 2024-01-18 DIAGNOSIS — M54.50 CHRONIC BILATERAL LOW BACK PAIN WITHOUT SCIATICA: Primary | ICD-10-CM

## 2024-01-18 DIAGNOSIS — M54.2 NECK PAIN: ICD-10-CM

## 2024-01-18 DIAGNOSIS — Z79.899 OTHER LONG TERM (CURRENT) DRUG THERAPY: ICD-10-CM

## 2024-01-18 DIAGNOSIS — R10.9 ABDOMINAL PAIN, UNSPECIFIED ABDOMINAL LOCATION: ICD-10-CM

## 2024-01-18 NOTE — PROGRESS NOTES
Subjective   Megan Stafford is a 48 y.o. female.     History of Present Illness  lower abdominal pain, rectal pain, began 2012 with dx of Ulcerative Colitis, 9/10 at worst, 7/10 at best, always present, varies with UC, helped only by meds, was taking Tramadol with modest benefit, no NSAIDs with UC, also takes Humira and other UC meds. X-ray pelvis w/o acute injury. CT head with sinusitis only. Prior notes reviewed, as above, referred for pain management, wants to take minimum pain meds possible. Started Norco 5/325mg QID prn, not effective, increased to 7.5/325mg QID prn, then 10/325mg QID prn with better relief, generally doing well now except during flares. Had bladder insterstim implanted with improvement in incontinence, no change to pain, stable still. Syncopal episodes, had cardiac cath. Worsening LBP, new MRI L-spine with L5/S1 listhesis 7mm, had fusion with Dr. Umaña with post-op pain, muscle spasms, controlled by Tizanidine.  Abdominal Pain  Associated symptoms include constipation, diarrhea, nausea and vomiting. Pertinent negatives include no arthralgias, fever or myalgias.   Back Pain  Associated symptoms include abdominal pain and numbness. Pertinent negatives include no bladder incontinence, chest pain, fever or weakness.        The following portions of the patient's history were reviewed and updated as appropriate: allergies, current medications, past family history, past medical history, past social history, past surgical history and problem list.    Review of Systems   Constitutional:  Positive for fatigue. Negative for chills and fever.   HENT:  Positive for trouble swallowing. Negative for hearing loss.    Eyes:  Negative for visual disturbance.   Respiratory:  Positive for shortness of breath.    Cardiovascular:  Negative for chest pain.   Gastrointestinal:  Positive for abdominal pain, constipation, diarrhea, nausea and vomiting.   Genitourinary:  Negative for urinary incontinence.  "  Musculoskeletal:  Positive for back pain. Negative for arthralgias, joint swelling, myalgias and neck pain.   Neurological:  Positive for numbness and headache. Negative for dizziness and weakness.       Objective   Physical Exam   Constitutional: She is oriented to person, place, and time. She appears well-developed and well-nourished.   HENT:   Head: Normocephalic and atraumatic.   Eyes: Pupils are equal, round, and reactive to light.   Cardiovascular: Normal rate, regular rhythm and normal heart sounds.   Pulmonary/Chest: Breath sounds normal.   Abdominal: Soft. Bowel sounds are normal. She exhibits no distension. There is no abdominal tenderness.   Neurological: She is alert and oriented to person, place, and time. She has normal reflexes. She displays normal reflexes. No sensory deficit.   Psychiatric: Her behavior is normal. Thought content normal.         Assessment & Plan   Diagnoses and all orders for this visit:    1. Chronic bilateral low back pain without sciatica (Primary)    2. Neck pain    3. Abdominal pain, unspecified abdominal location    4. Chronic pain disorder    5. Other long term (current) drug therapy    6. Rectal pain        INspect reviewed, in order, small amount of oxycodone from surgeon. Low risk. UDS 8/3/23 in order. \"Megan Ernie\" on Inspect.   Failed Norco 5/325mg QID prn, 7.5/325mg QID prn. Increased to Norco 10/325mg q4h prn, hopefully reduce in future, discussed need to balance pain control with adverse side effects of opioids previously. Using short-acting opioids as pain varies considerably. Was taking brand name only following severe allergic reaction after pharmacy changed generics, but using [facility] now. Filled 9/25/23. Stopped when she began Klonopin. Began Butrans 5mcg/hr q7d to minimize risk of respiratory depression, PCP still stopped Ambien and Klonopin, she stopped Butrans, she wants to stop opioids at this time.  Cont other meds as " prescribed.  Began Tizanidine 4mg TID prn, working well, increased to 6mg TID prn, failed Baclofen, cannot tolerate Flexeril.  RTC prn.

## 2024-01-24 ENCOUNTER — TELEPHONE (OUTPATIENT)
Dept: PSYCHIATRY | Facility: CLINIC | Age: 49
End: 2024-01-24
Payer: MEDICARE

## 2024-01-24 DIAGNOSIS — F33.2 SEVERE RECURRENT MAJOR DEPRESSION WITHOUT PSYCHOTIC FEATURES: Chronic | ICD-10-CM

## 2024-01-24 DIAGNOSIS — F99 INSOMNIA DUE TO OTHER MENTAL DISORDER: ICD-10-CM

## 2024-01-24 DIAGNOSIS — F41.1 GENERALIZED ANXIETY DISORDER: Chronic | ICD-10-CM

## 2024-01-24 DIAGNOSIS — F51.05 INSOMNIA DUE TO OTHER MENTAL DISORDER: ICD-10-CM

## 2024-01-24 RX ORDER — CLONAZEPAM 1 MG/1
1 TABLET ORAL 2 TIMES DAILY PRN
Qty: 60 TABLET | Refills: 1 | Status: SHIPPED | OUTPATIENT
Start: 2024-01-24

## 2024-01-24 RX ORDER — BUSPIRONE HYDROCHLORIDE 10 MG/1
10 TABLET ORAL 2 TIMES DAILY
Qty: 180 TABLET | Refills: 1 | Status: SHIPPED | OUTPATIENT
Start: 2024-01-24

## 2024-01-24 RX ORDER — ZOLPIDEM TARTRATE 10 MG/1
10 TABLET ORAL
Qty: 30 TABLET | Refills: 1 | Status: SHIPPED | OUTPATIENT
Start: 2024-01-24

## 2024-01-24 NOTE — TELEPHONE ENCOUNTER
Pt says her insurance is requiring her to use [facility] pharmacy now, so she needs all psych meds sent there.  I cancelled all meds with Nikki.

## 2024-01-24 NOTE — TELEPHONE ENCOUNTER
Including controlled meds?     Rx for rexulti, buspirone and trintellix was sent to the [facility]

## 2024-02-16 ENCOUNTER — TELEPHONE (OUTPATIENT)
Dept: PAIN MEDICINE | Facility: CLINIC | Age: 49
End: 2024-02-16
Payer: MEDICARE

## 2024-02-16 NOTE — TELEPHONE ENCOUNTER
Hub staff attempted to follow warm transfer process and was unsuccessful     Caller: PATIENT    Relationship to patient: SELF     Best call back number: [phone]    Patient is needing: PATIENT WAS CALLING TO FIND OUT WHAT SHE NEEDED TO DO TO MAKE SURE HER CONTINUATION OF CARE FORM FOR [employer] WAS COMPLETED CORRECTLY. PATIENT WANTED TO KNOW IF SHE NEEDED TO BRING THE FORM WITH HER TO APPT AND HAVE DR. OWUSU FILL IT OR IS THIS SOMETHING SHE WOULD FILL IT? PATIENT WOULD LIKE A CALL BACK TO DISCUSS HER CONTINUATION OF CARE PAPERWORK. THANK YOU!

## 2024-03-28 ENCOUNTER — OFFICE VISIT (OUTPATIENT)
Dept: PAIN MEDICINE | Facility: CLINIC | Age: 49
End: 2024-03-28
Payer: MEDICARE

## 2024-03-28 VITALS
HEART RATE: 81 BPM | SYSTOLIC BLOOD PRESSURE: 138 MMHG | OXYGEN SATURATION: 99 % | DIASTOLIC BLOOD PRESSURE: 83 MMHG | BODY MASS INDEX: 34.86 KG/M2 | RESPIRATION RATE: 16 BRPM | WEIGHT: 203.1 LBS

## 2024-03-28 DIAGNOSIS — R10.9 ABDOMINAL PAIN, UNSPECIFIED ABDOMINAL LOCATION: ICD-10-CM

## 2024-03-28 DIAGNOSIS — Z79.899 HIGH RISK MEDICATION USE: Primary | ICD-10-CM

## 2024-03-28 DIAGNOSIS — M54.50 CHRONIC BILATERAL LOW BACK PAIN WITHOUT SCIATICA: ICD-10-CM

## 2024-03-28 DIAGNOSIS — G89.29 CHRONIC BILATERAL LOW BACK PAIN WITHOUT SCIATICA: ICD-10-CM

## 2024-03-28 DIAGNOSIS — M54.2 NECK PAIN: ICD-10-CM

## 2024-03-28 DIAGNOSIS — G89.4 CHRONIC PAIN DISORDER: ICD-10-CM

## 2024-03-28 DIAGNOSIS — Z79.899 OTHER LONG TERM (CURRENT) DRUG THERAPY: ICD-10-CM

## 2024-03-28 RX ORDER — BUPRENORPHINE 5 UG/H
1 PATCH TRANSDERMAL WEEKLY
Qty: 4 PATCH | Refills: 2 | Status: SHIPPED | OUTPATIENT
Start: 2024-03-28

## 2024-03-28 NOTE — PROGRESS NOTES
Subjective   Megan Stafford is a 48 y.o. female.     History of Present Illness  lower abdominal pain, rectal pain, began 2012 with dx of Ulcerative Colitis, 9/10 at worst, 7/10 at best, always present, varies with UC, helped only by meds, was taking Tramadol with modest benefit, no NSAIDs with UC, also takes Humira and other UC meds. X-ray pelvis w/o acute injury. CT head with sinusitis only. Prior notes reviewed, as above, referred for pain management, wants to take minimum pain meds possible. Started Norco 5/325mg QID prn, not effective, increased to 7.5/325mg QID prn, then 10/325mg QID prn with better relief, generally doing well now except during flares. Had bladder insterstim implanted with improvement in incontinence, no change to pain, stable still. Syncopal episodes, had cardiac cath. Worsening LBP, new MRI L-spine with L5/S1 listhesis 7mm, had fusion with Dr. Umaña with post-op pain, muscle spasms, controlled by Tizanidine.  Back Pain  Associated symptoms include abdominal pain and numbness. Pertinent negatives include no bladder incontinence, chest pain, fever or weakness.   Abdominal Pain  Associated symptoms include constipation, diarrhea, nausea and vomiting. Pertinent negatives include no arthralgias, fever or myalgias.        The following portions of the patient's history were reviewed and updated as appropriate: allergies, current medications, past family history, past medical history, past social history, past surgical history and problem list.    Review of Systems   Constitutional:  Positive for fatigue. Negative for chills and fever.   HENT:  Positive for trouble swallowing. Negative for hearing loss.    Eyes:  Negative for visual disturbance.   Respiratory:  Positive for shortness of breath.    Cardiovascular:  Negative for chest pain.   Gastrointestinal:  Positive for abdominal pain, constipation, diarrhea, nausea and vomiting.   Genitourinary:  Negative for urinary incontinence.  "  Musculoskeletal:  Positive for back pain. Negative for arthralgias, joint swelling, myalgias and neck pain.   Neurological:  Positive for numbness and headache. Negative for dizziness and weakness.       Objective   Physical Exam   Constitutional: She is oriented to person, place, and time. She appears well-developed and well-nourished.   HENT:   Head: Normocephalic and atraumatic.   Eyes: Pupils are equal, round, and reactive to light.   Cardiovascular: Normal rate, regular rhythm and normal heart sounds.   Pulmonary/Chest: Breath sounds normal.   Abdominal: Soft. Bowel sounds are normal. She exhibits no distension. There is no abdominal tenderness.   Neurological: She is alert and oriented to person, place, and time. She has normal reflexes. She displays normal reflexes. No sensory deficit.   Psychiatric: Her behavior is normal. Thought content normal.         Assessment & Plan   Diagnoses and all orders for this visit:    1. Chronic bilateral low back pain without sciatica (Primary)    2. Abdominal pain, unspecified abdominal location    3. Chronic pain disorder    4. Neck pain    5. Other long term (current) drug therapy        INspect reviewed, in order, small amount of oxycodone from surgeon. Low risk. UDS 8/3/23 in order. \"Megan Ernie\" on Inspect.   Failed Norco 5/325mg QID prn, 7.5/325mg QID prn. Increased to Norco 10/325mg q4h prn, hopefully reduce in future, discussed need to balance pain control with adverse side effects of opioids previously. Using short-acting opioids as pain varies considerably. Was taking brand name only following severe allergic reaction after pharmacy changed generics, but using [facility] now. Filled 9/25/23. Stopped when she began Klonopin. Began Butrans 5mcg/hr q7d to minimize risk of respiratory depression, PCP still stopped Ambien and Klonopin, she stopped Butrans, she wanted to stop opioids, cannot, restart Butrans 5mcg/hr q7d.  Cont other meds as " prescribed.  Began Tizanidine 4mg TID prn, working well, increased to 6mg TID prn, failed Baclofen, cannot tolerate Flexeril.  RTC in 3 months for f/u.

## 2024-04-05 ENCOUNTER — TELEPHONE (OUTPATIENT)
Dept: PAIN MEDICINE | Facility: CLINIC | Age: 49
End: 2024-04-05
Payer: MEDICARE

## 2024-04-05 RX ORDER — TIZANIDINE 4 MG/1
6 TABLET ORAL EVERY 8 HOURS PRN
Qty: 1355 TABLET | Refills: 3 | Status: SHIPPED | OUTPATIENT
Start: 2024-04-05

## 2024-04-05 NOTE — TELEPHONE ENCOUNTER
Hub staff attempted to follow warm transfer process and was unsuccessful     Caller: DONAVAN    Relationship to patient: [facility]     Best call back number: [phone]    Patient is needing: RX TIZANIDINE  PLEASE RETURN CALL TO PHARMACY

## 2024-04-15 ENCOUNTER — DOCUMENTATION (OUTPATIENT)
Dept: PAIN MEDICINE | Facility: CLINIC | Age: 49
End: 2024-04-15
Payer: MEDICARE

## 2024-04-15 NOTE — PROGRESS NOTES
4/15/2024-- Called [facility] Jur[facility] and gave pharmacist info on pt being on Butrans and Ambien and  was aware, medication was on the chart

## 2024-06-19 DIAGNOSIS — F33.2 SEVERE RECURRENT MAJOR DEPRESSION WITHOUT PSYCHOTIC FEATURES: Chronic | ICD-10-CM

## 2024-06-19 RX ORDER — VORTIOXETINE 10 MG/1
10 TABLET, FILM COATED ORAL
Qty: 90 TABLET | Refills: 0 | Status: SHIPPED | OUTPATIENT
Start: 2024-06-19

## 2024-06-19 RX ORDER — BREXPIPRAZOLE 1 MG/1
1 TABLET ORAL EVERY MORNING
Qty: 90 TABLET | Refills: 0 | Status: SHIPPED | OUTPATIENT
Start: 2024-06-19

## 2024-06-19 NOTE — TELEPHONE ENCOUNTER
Pt says she is seeing a different doctor, and she will have them send the refill request to the other doctor.

## 2024-06-20 ENCOUNTER — OFFICE VISIT (OUTPATIENT)
Dept: PAIN MEDICINE | Facility: CLINIC | Age: 49
End: 2024-06-20
Payer: MEDICARE

## 2024-06-20 VITALS
HEART RATE: 79 BPM | BODY MASS INDEX: 34.28 KG/M2 | OXYGEN SATURATION: 98 % | DIASTOLIC BLOOD PRESSURE: 76 MMHG | SYSTOLIC BLOOD PRESSURE: 108 MMHG | WEIGHT: 199.7 LBS | RESPIRATION RATE: 16 BRPM

## 2024-06-20 DIAGNOSIS — Z79.899 HIGH RISK MEDICATION USE: Primary | ICD-10-CM

## 2024-06-20 DIAGNOSIS — Z79.899 OTHER LONG TERM (CURRENT) DRUG THERAPY: ICD-10-CM

## 2024-06-20 DIAGNOSIS — G89.29 CHRONIC BILATERAL LOW BACK PAIN WITHOUT SCIATICA: Primary | ICD-10-CM

## 2024-06-20 DIAGNOSIS — M54.50 CHRONIC BILATERAL LOW BACK PAIN WITHOUT SCIATICA: Primary | ICD-10-CM

## 2024-06-20 DIAGNOSIS — R10.9 ABDOMINAL PAIN, UNSPECIFIED ABDOMINAL LOCATION: ICD-10-CM

## 2024-06-20 DIAGNOSIS — G89.4 CHRONIC PAIN DISORDER: ICD-10-CM

## 2024-06-20 DIAGNOSIS — M54.2 NECK PAIN: ICD-10-CM

## 2024-06-20 RX ORDER — BUPRENORPHINE 10 UG/H
1 PATCH TRANSDERMAL
Qty: 4 PATCH | Refills: 2 | Status: SHIPPED | OUTPATIENT
Start: 2024-06-20

## 2024-06-20 NOTE — PROGRESS NOTES
Subjective   Megan Stafford is a 49 y.o. female.     History of Present Illness  lower abdominal pain, rectal pain, began 2012 with dx of Ulcerative Colitis, 9/10 at worst, 7/10 at best, always present, varies with UC, helped only by meds, was taking Tramadol with modest benefit, no NSAIDs with UC, also takes Humira and other UC meds. X-ray pelvis w/o acute injury. CT head with sinusitis only. Prior notes reviewed, as above, referred for pain management, wants to take minimum pain meds possible. Started Norco 5/325mg QID prn, not effective, increased to 7.5/325mg QID prn, then 10/325mg QID prn with better relief, generally doing well now except during flares. Had bladder insterstim implanted with improvement in incontinence, no change to pain, stable still. Syncopal episodes, had cardiac cath. Worsening LBP, new MRI L-spine with L5/S1 listhesis 7mm, had fusion with Dr. Umaña with post-op pain, muscle spasms, controlled by Tizanidine.  Back Pain  Associated symptoms include abdominal pain and numbness. Pertinent negatives include no bladder incontinence, chest pain, fever or weakness.   Abdominal Pain  Associated symptoms include constipation, diarrhea, nausea and vomiting. Pertinent negatives include no arthralgias, fever or myalgias.        The following portions of the patient's history were reviewed and updated as appropriate: allergies, current medications, past family history, past medical history, past social history, past surgical history and problem list.    Review of Systems   Constitutional:  Positive for fatigue. Negative for chills and fever.   HENT:  Positive for trouble swallowing. Negative for hearing loss.    Eyes:  Negative for visual disturbance.   Respiratory:  Positive for shortness of breath.    Cardiovascular:  Negative for chest pain.   Gastrointestinal:  Positive for abdominal pain, constipation, diarrhea, nausea and vomiting.   Genitourinary:  Negative for urinary incontinence.  "  Musculoskeletal:  Positive for back pain. Negative for arthralgias, joint swelling, myalgias and neck pain.   Neurological:  Positive for numbness and headache. Negative for dizziness and weakness.       Objective   Physical Exam   Constitutional: She is oriented to person, place, and time. She appears well-developed and well-nourished.   HENT:   Head: Normocephalic and atraumatic.   Eyes: Pupils are equal, round, and reactive to light.   Cardiovascular: Normal rate, regular rhythm and normal heart sounds.   Pulmonary/Chest: Breath sounds normal.   Abdominal: Soft. Bowel sounds are normal. She exhibits no distension. There is no abdominal tenderness.   Neurological: She is alert and oriented to person, place, and time. She has normal reflexes. She displays normal reflexes. No sensory deficit.   Psychiatric: Her behavior is normal. Thought content normal.         Assessment & Plan   Diagnoses and all orders for this visit:    1. Chronic bilateral low back pain without sciatica (Primary)    2. Neck pain    3. Abdominal pain, unspecified abdominal location    4. Chronic pain disorder    5. Other long term (current) drug therapy        INspect reviewed, in order. Low risk. UDS 3/28/24 in order. \"Megan Ernie\" on Inspect.   Failed Norco 5/325mg QID prn, 7.5/325mg QID prn. Increased to Norco 10/325mg q4h prn, hopefully reduce in future, discussed need to balance pain control with adverse side effects of opioids previously. Using short-acting opioids as pain varies considerably. Was taking brand name only following severe allergic reaction after pharmacy changed generics, but using [facility] now. Stopped when she began Klonopin. Began Butrans 5mcg/hr q7d to minimize risk of respiratory depression, PCP still stopped Ambien and Klonopin, she stopped Butrans, she wanted to stop opioids, cannot, restarted Butrans 5mcg/hr q7d, increase to 10mcg/hr q7d. Filled 6/11/24.  Cont other meds as prescribed.  Began " Tizanidine 4mg TID prn, working well, increased to 6mg TID prn, failed Baclofen, cannot tolerate Flexeril.  RTC in 3 months for f/u.                    Physical Exam  Constitutional:       Appearance: She is well-developed and well-nourished.   HENT:      Head: Normocephalic and atraumatic.   Eyes:      Pupils: Pupils are equal, round, and reactive to light.   Cardiovascular:      Rate and Rhythm: Normal rate and regular rhythm.      Heart sounds: Normal heart sounds.   Pulmonary:      Breath sounds: Normal breath sounds.   Abdominal:      General: Bowel sounds are normal. There is no distension.      Palpations: Abdomen is soft.      Tenderness: There is no abdominal tenderness.   Neurological:      Mental Status: She is alert and oriented to person, place, and time.      Sensory: No sensory deficit.      Deep Tendon Reflexes: Reflexes are normal and symmetric. Reflexes normal.   Psychiatric:         Behavior: Behavior normal.         Thought Content: Thought content normal.

## 2024-07-29 NOTE — TELEPHONE ENCOUNTER
Rx Refill Note  Requested Prescriptions     Pending Prescriptions Disp Refills    busPIRone (BUSPAR) 10 MG tablet [Pharmacy Med Name: BUSPIRONE HYDROCHLORIDE 10 MG Tablet] 180 tablet 3     Sig: TAKE 1 TABLET TWICE DAILY      Last office visit with prescribing clinician: 1/5/2024   Last telemedicine visit with prescribing clinician: Visit date not found   Next office visit with prescribing clinician: LVM for pt to make appt  Office Visit with Susie Reyes MD (01/05/2024)                       Would you like a call back once the refill request has been completed: [] Yes [] No    If the office needs to give you a call back, can they leave a voicemail: [] Yes [] No    Mora Avitia MA  07/29/24, 09:28 EDT

## 2024-07-31 RX ORDER — BUSPIRONE HYDROCHLORIDE 10 MG/1
10 TABLET ORAL 2 TIMES DAILY
Qty: 180 TABLET | Refills: 3 | OUTPATIENT
Start: 2024-07-31

## 2024-08-23 ENCOUNTER — OFFICE VISIT (OUTPATIENT)
Dept: PAIN MEDICINE | Facility: CLINIC | Age: 49
End: 2024-08-23
Payer: MEDICARE

## 2024-08-23 VITALS
WEIGHT: 190 LBS | SYSTOLIC BLOOD PRESSURE: 99 MMHG | DIASTOLIC BLOOD PRESSURE: 58 MMHG | HEART RATE: 69 BPM | OXYGEN SATURATION: 99 % | BODY MASS INDEX: 32.61 KG/M2 | RESPIRATION RATE: 16 BRPM

## 2024-08-23 DIAGNOSIS — M54.50 CHRONIC BILATERAL LOW BACK PAIN WITHOUT SCIATICA: Primary | ICD-10-CM

## 2024-08-23 DIAGNOSIS — G89.4 CHRONIC PAIN DISORDER: ICD-10-CM

## 2024-08-23 DIAGNOSIS — G89.29 CHRONIC BILATERAL LOW BACK PAIN WITHOUT SCIATICA: Primary | ICD-10-CM

## 2024-08-23 DIAGNOSIS — K62.89 RECTAL PAIN: ICD-10-CM

## 2024-08-23 DIAGNOSIS — M54.2 NECK PAIN: ICD-10-CM

## 2024-08-23 DIAGNOSIS — R10.9 ABDOMINAL PAIN, UNSPECIFIED ABDOMINAL LOCATION: ICD-10-CM

## 2024-08-23 DIAGNOSIS — Z79.899 OTHER LONG TERM (CURRENT) DRUG THERAPY: ICD-10-CM

## 2024-08-23 RX ORDER — BUPRENORPHINE 10 UG/H
1 PATCH TRANSDERMAL
Qty: 4 PATCH | Refills: 2 | Status: SHIPPED | OUTPATIENT
Start: 2024-08-23

## 2024-08-23 NOTE — PROGRESS NOTES
Subjective   Megan Stafford is a 49 y.o. female.     History of Present Illness  lower abdominal pain, rectal pain, began 2012 with dx of Ulcerative Colitis, 9/10 at worst, 7/10 at best, always present, varies with UC, helped only by meds, was taking Tramadol with modest benefit, no NSAIDs with UC, also takes Humira and other UC meds. X-ray pelvis w/o acute injury. CT head with sinusitis only. Prior notes reviewed, as above, referred for pain management, wants to take minimum pain meds possible. Started Norco 5/325mg QID prn, not effective, increased to 7.5/325mg QID prn, then 10/325mg QID prn with better relief, generally doing well now except during flares. Had bladder insterstim implanted with improvement in incontinence, no change to pain, stable still. Syncopal episodes, had cardiac cath. Worsening LBP, new MRI L-spine with L5/S1 listhesis 7mm, had fusion with Dr. Umaña with post-op pain, muscle spasms, controlled by Tizanidine.  Back Pain  Associated symptoms include abdominal pain and numbness. Pertinent negatives include no bladder incontinence, chest pain, fever or weakness.   Abdominal Pain  Associated symptoms include constipation, diarrhea, nausea and vomiting. Pertinent negatives include no arthralgias, fever or myalgias.        The following portions of the patient's history were reviewed and updated as appropriate: allergies, current medications, past family history, past medical history, past social history, past surgical history and problem list.    Review of Systems   Constitutional:  Positive for fatigue. Negative for chills and fever.   HENT:  Positive for trouble swallowing. Negative for hearing loss.    Eyes:  Negative for visual disturbance.   Respiratory:  Positive for shortness of breath.    Cardiovascular:  Negative for chest pain.   Gastrointestinal:  Positive for abdominal pain, constipation, diarrhea, nausea and vomiting.   Genitourinary:  Negative for urinary incontinence.  "  Musculoskeletal:  Positive for back pain. Negative for arthralgias, joint swelling, myalgias and neck pain.   Neurological:  Positive for numbness and headache. Negative for dizziness and weakness.       Objective   Physical Exam   Constitutional: She is oriented to person, place, and time. She appears well-developed and well-nourished.   HENT:   Head: Normocephalic and atraumatic.   Eyes: Pupils are equal, round, and reactive to light.   Cardiovascular: Normal rate, regular rhythm and normal heart sounds.   Pulmonary/Chest: Breath sounds normal.   Abdominal: Soft. Bowel sounds are normal. She exhibits no distension. There is no abdominal tenderness.   Neurological: She is alert and oriented to person, place, and time. She has normal reflexes. She displays normal reflexes. No sensory deficit.   Psychiatric: Her behavior is normal. Thought content normal.         Assessment & Plan   Diagnoses and all orders for this visit:    1. Chronic bilateral low back pain without sciatica (Primary)    2. Neck pain    3. Abdominal pain, unspecified abdominal location    4. Chronic pain disorder    5. Other long term (current) drug therapy    6. Rectal pain        INspect reviewed, in order. Low risk. UDS 3/28/24 in order. \"Megan Ernie\" on Inspect.   Failed Norco 5/325mg QID prn, 7.5/325mg QID prn. Increased to Norco 10/325mg q4h prn, hopefully reduce in future, discussed need to balance pain control with adverse side effects of opioids previously. Using short-acting opioids as pain varies considerably. Was taking brand name only following severe allergic reaction after pharmacy changed generics, but using [facility] now. Stopped when she began Klonopin. Began Butrans 5mcg/hr q7d to minimize risk of respiratory depression, PCP still stopped Ambien and Klonopin, she stopped Butrans, she wanted to stop opioids, cannot, restarted Butrans 5mcg/hr q7d, increased to 10mcg/hr q7d. Filled 8/5/24.  Patient's symptoms are " still adequately managed by current medication regimen, is doing well at this strength and dosage, therefore I will continue to prescribe unchanged as the most appropriate course of treatment.  Cont other meds as prescribed.  Began Tizanidine 4mg TID prn, working well, increased to 6mg TID prn, failed Baclofen, cannot tolerate Flexeril.  RTC in 3 months for f/u.

## 2024-08-30 DIAGNOSIS — F33.2 SEVERE RECURRENT MAJOR DEPRESSION WITHOUT PSYCHOTIC FEATURES: Chronic | ICD-10-CM

## 2024-08-30 PROCEDURE — 93005 ELECTROCARDIOGRAM TRACING: CPT | Performed by: UROLOGY

## 2024-08-30 RX ORDER — BREXPIPRAZOLE 1 MG/1
1 TABLET ORAL EVERY MORNING
Qty: 90 TABLET | Refills: 0 | OUTPATIENT
Start: 2024-08-30

## 2024-08-30 NOTE — TELEPHONE ENCOUNTER
Rx Refill Note  Requested Prescriptions     Pending Prescriptions Disp Refills    Vortioxetine HBr (Trintellix) 10 MG tablet tablet 90 tablet 0     Sig: Take 1 tablet by mouth Daily With Breakfast.    Brexpiprazole (Rexulti) 1 MG tablet 90 tablet 0     Sig: Take 1 tablet by mouth Every Morning.      Last office visit with prescribing clinician: 1/5/2024   Last telemedicine visit with prescribing clinician: Visit date not found   Next office visit with prescribing clinician: M TO MAKE APPT  Office Visit with Susie Reyes MD (01/05/2024)                       Would you like a call back once the refill request has been completed: [] Yes [] No    If the office needs to give you a call back, can they leave a voicemail: [] Yes [] No    Mora Avitia MA  08/30/24, 11:12 EDT

## 2024-09-03 ENCOUNTER — ANESTHESIA EVENT (OUTPATIENT)
Dept: PERIOP | Facility: HOSPITAL | Age: 49
End: 2024-09-03
Payer: MEDICARE

## 2024-09-03 ENCOUNTER — HOSPITAL ENCOUNTER (OUTPATIENT)
Facility: HOSPITAL | Age: 49
Setting detail: HOSPITAL OUTPATIENT SURGERY
Discharge: HOME | End: 2024-09-03
Attending: UROLOGY | Admitting: UROLOGY
Payer: MEDICARE

## 2024-09-03 ENCOUNTER — ANESTHESIA (OUTPATIENT)
Dept: PERIOP | Facility: HOSPITAL | Age: 49
End: 2024-09-03
Payer: MEDICARE

## 2024-09-03 ENCOUNTER — APPOINTMENT (OUTPATIENT)
Dept: GENERAL RADIOLOGY | Facility: HOSPITAL | Age: 49
End: 2024-09-03
Payer: MEDICARE

## 2024-09-03 VITALS
DIASTOLIC BLOOD PRESSURE: 71 MMHG | HEIGHT: 62 IN | WEIGHT: 186.8 LBS | HEART RATE: 70 BPM | SYSTOLIC BLOOD PRESSURE: 113 MMHG | TEMPERATURE: 98 F | RESPIRATION RATE: 13 BRPM | OXYGEN SATURATION: 94 % | BODY MASS INDEX: 34.37 KG/M2

## 2024-09-03 DIAGNOSIS — N20.1 CALCULUS OF URETER: Primary | ICD-10-CM

## 2024-09-03 LAB
ANION GAP SERPL CALCULATED.3IONS-SCNC: 8.7 MMOL/L (ref 5–15)
BUN SERPL-MCNC: 17 MG/DL (ref 6–20)
BUN/CREAT SERPL: 23 (ref 7–25)
CALCIUM SPEC-SCNC: 9.7 MG/DL (ref 8.6–10.5)
CHLORIDE SERPL-SCNC: 106 MMOL/L (ref 98–107)
CO2 SERPL-SCNC: 23.3 MMOL/L (ref 22–29)
CREAT SERPL-MCNC: 0.74 MG/DL (ref 0.57–1)
DEPRECATED RDW RBC AUTO: 45.9 FL (ref 37–54)
EGFRCR SERPLBLD CKD-EPI 2021: 99.3 ML/MIN/1.73
ERYTHROCYTE [DISTWIDTH] IN BLOOD BY AUTOMATED COUNT: 13.8 % (ref 12.3–15.4)
GLUCOSE SERPL-MCNC: 108 MG/DL (ref 65–99)
HCT VFR BLD AUTO: 41 % (ref 34–46.6)
HGB BLD-MCNC: 13.3 G/DL (ref 12–15.9)
MCH RBC QN AUTO: 29.4 PG (ref 26.6–33)
MCHC RBC AUTO-ENTMCNC: 32.4 G/DL (ref 31.5–35.7)
MCV RBC AUTO: 90.7 FL (ref 79–97)
PLATELET # BLD AUTO: 201 10*3/MM3 (ref 140–450)
PMV BLD AUTO: 9.7 FL (ref 6–12)
POTASSIUM SERPL-SCNC: 3.8 MMOL/L (ref 3.5–5.2)
RBC # BLD AUTO: 4.52 10*6/MM3 (ref 3.77–5.28)
SODIUM SERPL-SCNC: 138 MMOL/L (ref 136–145)
WBC NRBC COR # BLD AUTO: 10.48 10*3/MM3 (ref 3.4–10.8)

## 2024-09-03 PROCEDURE — 25010000002 PROPOFOL 500 MG/50ML EMULSION: Performed by: NURSE ANESTHETIST, CERTIFIED REGISTERED

## 2024-09-03 PROCEDURE — C1758 CATHETER, URETERAL: HCPCS | Performed by: UROLOGY

## 2024-09-03 PROCEDURE — C2617 STENT, NON-COR, TEM W/O DEL: HCPCS | Performed by: UROLOGY

## 2024-09-03 PROCEDURE — 93005 ELECTROCARDIOGRAM TRACING: CPT

## 2024-09-03 PROCEDURE — 25010000002 MIDAZOLAM PER 1 MG: Performed by: NURSE ANESTHETIST, CERTIFIED REGISTERED

## 2024-09-03 PROCEDURE — 85027 COMPLETE CBC AUTOMATED: CPT | Performed by: UROLOGY

## 2024-09-03 PROCEDURE — 25010000002 MORPHINE PER 10 MG: Performed by: ANESTHESIOLOGY

## 2024-09-03 PROCEDURE — 80048 BASIC METABOLIC PNL TOTAL CA: CPT | Performed by: UROLOGY

## 2024-09-03 PROCEDURE — 25010000002 CEFAZOLIN PER 500 MG: Performed by: UROLOGY

## 2024-09-03 PROCEDURE — 25010000002 HYDROMORPHONE 1 MG/ML SOLUTION: Performed by: NURSE ANESTHETIST, CERTIFIED REGISTERED

## 2024-09-03 PROCEDURE — 25010000002 FENTANYL CITRATE (PF) 50 MCG/ML SOLUTION: Performed by: NURSE ANESTHETIST, CERTIFIED REGISTERED

## 2024-09-03 PROCEDURE — 76000 FLUOROSCOPY <1 HR PHYS/QHP: CPT

## 2024-09-03 PROCEDURE — 25010000002 DEXAMETHASONE SODIUM PHOSPHATE 20 MG/5ML SOLUTION: Performed by: NURSE ANESTHETIST, CERTIFIED REGISTERED

## 2024-09-03 PROCEDURE — C1769 GUIDE WIRE: HCPCS | Performed by: UROLOGY

## 2024-09-03 PROCEDURE — 25010000002 FENTANYL CITRATE (PF) 100 MCG/2ML SOLUTION: Performed by: NURSE ANESTHETIST, CERTIFIED REGISTERED

## 2024-09-03 PROCEDURE — 25010000002 ONDANSETRON PER 1 MG: Performed by: NURSE ANESTHETIST, CERTIFIED REGISTERED

## 2024-09-03 PROCEDURE — 25810000003 LACTATED RINGERS PER 1000 ML: Performed by: NURSE ANESTHETIST, CERTIFIED REGISTERED

## 2024-09-03 DEVICE — VARIABLE LENGTH URETERAL STENT
Type: IMPLANTABLE DEVICE | Site: URETER | Status: FUNCTIONAL
Brand: CONTOUR VL™

## 2024-09-03 RX ORDER — SODIUM CHLORIDE 0.9 % (FLUSH) 0.9 %
10 SYRINGE (ML) INJECTION AS NEEDED
Status: DISCONTINUED | OUTPATIENT
Start: 2024-09-03 | End: 2024-09-03 | Stop reason: HOSPADM

## 2024-09-03 RX ORDER — OXYCODONE AND ACETAMINOPHEN 5; 325 MG/1; MG/1
1-2 TABLET ORAL EVERY 6 HOURS PRN
Qty: 15 TABLET | Refills: 0 | Status: SHIPPED | OUTPATIENT
Start: 2024-09-03

## 2024-09-03 RX ORDER — MIDAZOLAM HYDROCHLORIDE 1 MG/ML
INJECTION INTRAMUSCULAR; INTRAVENOUS AS NEEDED
Status: DISCONTINUED | OUTPATIENT
Start: 2024-09-03 | End: 2024-09-03 | Stop reason: SURG

## 2024-09-03 RX ORDER — FLUMAZENIL 0.1 MG/ML
0.2 INJECTION INTRAVENOUS AS NEEDED
Status: DISCONTINUED | OUTPATIENT
Start: 2024-09-03 | End: 2024-09-03 | Stop reason: HOSPADM

## 2024-09-03 RX ORDER — PROMETHAZINE HYDROCHLORIDE 25 MG/1
25 SUPPOSITORY RECTAL ONCE AS NEEDED
Status: DISCONTINUED | OUTPATIENT
Start: 2024-09-03 | End: 2024-09-03 | Stop reason: HOSPADM

## 2024-09-03 RX ORDER — SODIUM CHLORIDE, SODIUM LACTATE, POTASSIUM CHLORIDE, CALCIUM CHLORIDE 600; 310; 30; 20 MG/100ML; MG/100ML; MG/100ML; MG/100ML
INJECTION, SOLUTION INTRAVENOUS CONTINUOUS PRN
Status: DISCONTINUED | OUTPATIENT
Start: 2024-09-03 | End: 2024-09-03 | Stop reason: SURG

## 2024-09-03 RX ORDER — ONDANSETRON 2 MG/ML
4 INJECTION INTRAMUSCULAR; INTRAVENOUS ONCE AS NEEDED
Status: DISCONTINUED | OUTPATIENT
Start: 2024-09-03 | End: 2024-09-03 | Stop reason: HOSPADM

## 2024-09-03 RX ORDER — DROPERIDOL 2.5 MG/ML
0.62 INJECTION, SOLUTION INTRAMUSCULAR; INTRAVENOUS
Status: DISCONTINUED | OUTPATIENT
Start: 2024-09-03 | End: 2024-09-03 | Stop reason: HOSPADM

## 2024-09-03 RX ORDER — PROPOFOL 10 MG/ML
INJECTION, EMULSION INTRAVENOUS AS NEEDED
Status: DISCONTINUED | OUTPATIENT
Start: 2024-09-03 | End: 2024-09-03 | Stop reason: SURG

## 2024-09-03 RX ORDER — NITROFURANTOIN 25; 75 MG/1; MG/1
100 CAPSULE ORAL 2 TIMES DAILY
Qty: 14 CAPSULE | Refills: 0 | Status: SHIPPED | OUTPATIENT
Start: 2024-09-03

## 2024-09-03 RX ORDER — LIDOCAINE HYDROCHLORIDE 10 MG/ML
0.5 INJECTION, SOLUTION INFILTRATION; PERINEURAL ONCE AS NEEDED
Status: DISCONTINUED | OUTPATIENT
Start: 2024-09-03 | End: 2024-09-03 | Stop reason: HOSPADM

## 2024-09-03 RX ORDER — PHENAZOPYRIDINE HYDROCHLORIDE 100 MG/1
100 TABLET, FILM COATED ORAL 3 TIMES DAILY PRN
Qty: 15 TABLET | Refills: 0 | Status: SHIPPED | OUTPATIENT
Start: 2024-09-03

## 2024-09-03 RX ORDER — DIPHENHYDRAMINE HYDROCHLORIDE 50 MG/ML
12.5 INJECTION INTRAMUSCULAR; INTRAVENOUS
Status: DISCONTINUED | OUTPATIENT
Start: 2024-09-03 | End: 2024-09-03 | Stop reason: HOSPADM

## 2024-09-03 RX ORDER — PROMETHAZINE HYDROCHLORIDE 25 MG/1
25 TABLET ORAL ONCE AS NEEDED
Status: DISCONTINUED | OUTPATIENT
Start: 2024-09-03 | End: 2024-09-03 | Stop reason: HOSPADM

## 2024-09-03 RX ORDER — SODIUM CHLORIDE, SODIUM LACTATE, POTASSIUM CHLORIDE, CALCIUM CHLORIDE 600; 310; 30; 20 MG/100ML; MG/100ML; MG/100ML; MG/100ML
20 INJECTION, SOLUTION INTRAVENOUS ONCE
Status: DISCONTINUED | OUTPATIENT
Start: 2024-09-03 | End: 2024-09-03 | Stop reason: HOSPADM

## 2024-09-03 RX ORDER — FENTANYL CITRATE 50 UG/ML
50 INJECTION, SOLUTION INTRAMUSCULAR; INTRAVENOUS
Status: DISCONTINUED | OUTPATIENT
Start: 2024-09-03 | End: 2024-09-03 | Stop reason: HOSPADM

## 2024-09-03 RX ORDER — FLUCONAZOLE 100 MG/1
100 TABLET ORAL DAILY
Qty: 3 TABLET | Refills: 0 | Status: SHIPPED | OUTPATIENT
Start: 2024-09-03 | End: 2024-09-06

## 2024-09-03 RX ORDER — NALOXONE HCL 0.4 MG/ML
0.2 VIAL (ML) INJECTION AS NEEDED
Status: DISCONTINUED | OUTPATIENT
Start: 2024-09-03 | End: 2024-09-03 | Stop reason: HOSPADM

## 2024-09-03 RX ORDER — DOCUSATE SODIUM 100 MG/1
100 CAPSULE, LIQUID FILLED ORAL 2 TIMES DAILY PRN
Qty: 30 CAPSULE | Refills: 1 | Status: SHIPPED | OUTPATIENT
Start: 2024-09-03

## 2024-09-03 RX ORDER — FENTANYL CITRATE 50 UG/ML
INJECTION, SOLUTION INTRAMUSCULAR; INTRAVENOUS AS NEEDED
Status: DISCONTINUED | OUTPATIENT
Start: 2024-09-03 | End: 2024-09-03 | Stop reason: SURG

## 2024-09-03 RX ORDER — ONDANSETRON 2 MG/ML
INJECTION INTRAMUSCULAR; INTRAVENOUS AS NEEDED
Status: DISCONTINUED | OUTPATIENT
Start: 2024-09-03 | End: 2024-09-03 | Stop reason: SURG

## 2024-09-03 RX ORDER — DEXAMETHASONE SODIUM PHOSPHATE 4 MG/ML
INJECTION, SOLUTION INTRA-ARTICULAR; INTRALESIONAL; INTRAMUSCULAR; INTRAVENOUS; SOFT TISSUE AS NEEDED
Status: DISCONTINUED | OUTPATIENT
Start: 2024-09-03 | End: 2024-09-03 | Stop reason: SURG

## 2024-09-03 RX ORDER — IPRATROPIUM BROMIDE AND ALBUTEROL SULFATE 2.5; .5 MG/3ML; MG/3ML
3 SOLUTION RESPIRATORY (INHALATION) ONCE AS NEEDED
Status: DISCONTINUED | OUTPATIENT
Start: 2024-09-03 | End: 2024-09-03 | Stop reason: HOSPADM

## 2024-09-03 RX ORDER — SODIUM CHLORIDE, SODIUM LACTATE, POTASSIUM CHLORIDE, CALCIUM CHLORIDE 600; 310; 30; 20 MG/100ML; MG/100ML; MG/100ML; MG/100ML
1000 INJECTION, SOLUTION INTRAVENOUS CONTINUOUS
Status: DISCONTINUED | OUTPATIENT
Start: 2024-09-03 | End: 2024-09-03 | Stop reason: HOSPADM

## 2024-09-03 RX ADMIN — SODIUM CHLORIDE, SODIUM LACTATE, POTASSIUM CHLORIDE, AND CALCIUM CHLORIDE: .6; .31; .03; .02 INJECTION, SOLUTION INTRAVENOUS at 11:56

## 2024-09-03 RX ADMIN — FENTANYL CITRATE 50 MCG: 50 INJECTION, SOLUTION INTRAMUSCULAR; INTRAVENOUS at 12:10

## 2024-09-03 RX ADMIN — MIDAZOLAM 2 MG: 1 INJECTION INTRAMUSCULAR; INTRAVENOUS at 11:57

## 2024-09-03 RX ADMIN — SODIUM CHLORIDE 2000 MG: 900 INJECTION INTRAVENOUS at 11:51

## 2024-09-03 RX ADMIN — FENTANYL CITRATE 50 MCG: 50 INJECTION, SOLUTION INTRAMUSCULAR; INTRAVENOUS at 12:07

## 2024-09-03 RX ADMIN — FENTANYL CITRATE 50 MCG: 50 INJECTION, SOLUTION INTRAMUSCULAR; INTRAVENOUS at 12:03

## 2024-09-03 RX ADMIN — FENTANYL CITRATE 50 MCG: 50 INJECTION, SOLUTION INTRAMUSCULAR; INTRAVENOUS at 12:55

## 2024-09-03 RX ADMIN — MORPHINE SULFATE 2 MG: 4 INJECTION INTRAVENOUS at 10:21

## 2024-09-03 RX ADMIN — LIDOCAINE HYDROCHLORIDE 50 MG: 20 INJECTION, SOLUTION EPIDURAL; INFILTRATION; INTRACAUDAL; PERINEURAL at 11:59

## 2024-09-03 RX ADMIN — ONDANSETRON 4 MG: 2 INJECTION INTRAMUSCULAR; INTRAVENOUS at 12:10

## 2024-09-03 RX ADMIN — PROPOFOL INJECTABLE EMULSION 300 MG: 10 INJECTION, EMULSION INTRAVENOUS at 11:59

## 2024-09-03 RX ADMIN — HYDROMORPHONE HYDROCHLORIDE 0.5 MG: 1 INJECTION, SOLUTION INTRAMUSCULAR; INTRAVENOUS; SUBCUTANEOUS at 12:42

## 2024-09-03 RX ADMIN — DEXAMETHASONE SODIUM PHOSPHATE 8 MG: 4 INJECTION, SOLUTION INTRAMUSCULAR; INTRAVENOUS at 12:07

## 2024-09-03 RX ADMIN — FENTANYL CITRATE 50 MCG: 50 INJECTION, SOLUTION INTRAMUSCULAR; INTRAVENOUS at 12:01

## 2024-09-03 NOTE — ANESTHESIA PROCEDURE NOTES
Airway  Urgency: elective    Date/Time: 9/3/2024 12:00 PM  Airway not difficult    General Information and Staff    Patient location during procedure: OR  CRNA/CAA: Jeff Basilio CRNA    Indications and Patient Condition    Preoxygenated: yes      Final Airway Details  Final airway type: supraglottic airway      Successful airway: classic  Size 4     Number of attempts at approach: 1  Assessment: lips, teeth, and gum same as pre-op and atraumatic intubation

## 2024-09-03 NOTE — ANESTHESIA POSTPROCEDURE EVALUATION
Patient: Megan Stafford    Procedure Summary       Date: 09/03/24 Room / Location: [facility] OR 11 / [facility] MAIN OR    Anesthesia Start: 1156 Anesthesia Stop: 1223    Procedure: CYSTOSCOPY URETEROSCOPY ,LASER LITHOTRIPSY, STONE BASKET EXTRACTION, STENT INSERTION (Right) Diagnosis:       Calculus, renal      Calculus of ureter      (Calculus, renal [N20.0])      (Calculus of ureter [N20.1])    Surgeons: Musa Hernandez MD Provider: Nahun Sampson MD    Anesthesia Type: general ASA Status: 3            Anesthesia Type: general    Vitals  Vitals Value Taken Time   /64 09/03/24 1243   Temp 98.4 °F (36.9 °C) 09/03/24 1225   Pulse 89 09/03/24 1246   Resp 20 09/03/24 1235   SpO2 89 % 09/03/24 1246   Vitals shown include unfiled device data.        Post Anesthesia Care and Evaluation    Patient location during evaluation: PACU  Patient participation: complete - patient participated  Level of consciousness: awake  Pain scale: See nurse's notes for pain score.  Pain management: adequate    Airway patency: patent  Anesthetic complications: No anesthetic complications  PONV Status: none  Cardiovascular status: acceptable  Respiratory status: acceptable and spontaneous ventilation  Hydration status: acceptable    Comments: Patient seen and examined postoperatively; vital signs stable; SpO2 greater than or equal to 90%; cardiopulmonary status stable; nausea/vomiting adequately controlled; pain adequately controlled; no apparent anesthesia complications; patient discharged from anesthesia care when discharge criteria were met

## 2024-09-03 NOTE — OP NOTE
Urology Operative Note    9/3/2024    Megan Stafford  49 y.o.  1975  female  [id number]      Surgeon(s) and Role:  Musa Hernandez MD - Primary     Pre-operative Diagnosis: 3mm R UVJ stone    Post-operative Diagnosis: Same    Complications: None    Procedures:  Cystoscopy  Right Ureteroscopy  Basket-extraction of stone  Stent placement  Fluoroscopy with Interpretation     Indications   Megan Stafford is a 49 y.o. female who was found to have 3 mm right distal stone she had uncontrolled pain so elected for intervention    During the informed consent process, the procedure was discussed in detail including the risks of bleeding, infection, damage to surrounding structures and need for staged procedure.      Findings     Fluoroscopy with interpretation: Wire placed the upper pole of the kidney.  Stent placed with redundant curl in the renal pelvis.    Description of procedure:  The patient was properly identified in the preoperative holding area and taken to the operating room where general anesthesia was induced. The patient was placed in the cystolithotomy position and prepped and draped in a sterile fashion. The patient was given antibiotics intravenously before the start of the surgery. After ensuring that all of the required equipment was ready and available a surgical timeout was performed.     A 21 Grenadian rigid cystoscope was inserted into the bladder under direct visualization.   A Sensor wire was passed up the ureter under fluoroscopic guidance.  Dual-lumen used to dilate the UVJ.  Semirigid ureteroscope was passed into the ureter. The stone was visualized. The stone was easily extracted with the basket  No more stones were seen within the ureter under direct visualization.   The cystoscope was then replaced over the wire and a 6 Grenadian x multi cm stent was placed under direct and fluoroscopic visualization.  The bladder was then emptied and scope removed.    There were no apparent complications. The  patient woke up in the operating room and was taken to the recovery room in stable condition.     I was present and scrubbed for the entire procedure.     Specimens: Stone    Estimated Blood Loss: minimal      Plan   -Follow up with Dr. Hernandez next week for stent removal         Musa Hernandez MD  Count includes the [facility]  Formerly [facility] [facility], [address]  [place] [address] [phone]

## 2024-09-03 NOTE — ANESTHESIA PREPROCEDURE EVALUATION
Anesthesia Evaluation     NPO Solid Status: > 8 hours  NPO Liquid Status: > 8 hours           Airway   Mallampati: II  TM distance: >3 FB  Neck ROM: full  No difficulty expected  Dental - normal exam     Pulmonary - normal exam   Cardiovascular - normal exam    (+) hypertension well controlled, hyperlipidemia      Neuro/Psych  (+) headaches, numbness, psychiatric history Anxiety and Depression  GI/Hepatic/Renal/Endo    (+) GERD well controlled, thyroid problem hypothyroidism    Musculoskeletal     (+) neck pain  Abdominal  - normal exam    Bowel sounds: normal.   Substance History      OB/GYN          Other   arthritis,                 Anesthesia Plan    ASA 3     general     intravenous induction     Anesthetic plan, risks, benefits, and alternatives have been provided, discussed and informed consent has been obtained with: patient.  Pre-procedure education provided  Plan discussed with CRNA.    CODE STATUS:

## 2024-09-04 ENCOUNTER — TELEPHONE (OUTPATIENT)
Dept: PAIN MEDICINE | Facility: CLINIC | Age: 49
End: 2024-09-04
Payer: MEDICARE

## 2024-09-04 LAB
QT INTERVAL: 436 MS
QTC INTERVAL: 445 MS

## 2024-11-21 ENCOUNTER — OFFICE VISIT (OUTPATIENT)
Dept: PAIN MEDICINE | Facility: CLINIC | Age: 49
End: 2024-11-21
Payer: MEDICARE

## 2024-11-21 VITALS
OXYGEN SATURATION: 100 % | DIASTOLIC BLOOD PRESSURE: 80 MMHG | HEART RATE: 74 BPM | SYSTOLIC BLOOD PRESSURE: 123 MMHG | BODY MASS INDEX: 32.19 KG/M2 | RESPIRATION RATE: 16 BRPM | WEIGHT: 176 LBS

## 2024-11-21 DIAGNOSIS — M54.2 NECK PAIN: Primary | ICD-10-CM

## 2024-11-21 DIAGNOSIS — K62.89 RECTAL PAIN: ICD-10-CM

## 2024-11-21 DIAGNOSIS — Z79.899 OTHER LONG TERM (CURRENT) DRUG THERAPY: ICD-10-CM

## 2024-11-21 DIAGNOSIS — G89.4 CHRONIC PAIN DISORDER: ICD-10-CM

## 2024-11-21 DIAGNOSIS — M54.50 CHRONIC BILATERAL LOW BACK PAIN WITHOUT SCIATICA: ICD-10-CM

## 2024-11-21 DIAGNOSIS — G89.29 CHRONIC BILATERAL LOW BACK PAIN WITHOUT SCIATICA: ICD-10-CM

## 2024-11-21 DIAGNOSIS — R10.9 ABDOMINAL PAIN, UNSPECIFIED ABDOMINAL LOCATION: ICD-10-CM

## 2024-11-21 RX ORDER — BUPRENORPHINE 10 UG/H
1 PATCH TRANSDERMAL
Qty: 4 PATCH | Refills: 2 | Status: SHIPPED | OUTPATIENT
Start: 2024-11-21

## 2024-11-21 NOTE — PROGRESS NOTES
Subjective   Megan Stafford is a 49 y.o. female.     History of Present Illness  lower abdominal pain, rectal pain, began 2012 with dx of Ulcerative Colitis, 9/10 at worst, 7/10 at best, always present, varies with UC, helped only by meds, was taking Tramadol with modest benefit, no NSAIDs with UC, also takes Humira and other UC meds. X-ray pelvis w/o acute injury. CT head with sinusitis only. Prior notes reviewed, as above, referred for pain management, wants to take minimum pain meds possible. Started Norco 5/325mg QID prn, not effective, increased to 7.5/325mg QID prn, then 10/325mg QID prn with better relief, generally doing well now except during flares. Had bladder insterstim implanted with improvement in incontinence, no change to pain, stable still. Syncopal episodes, had cardiac cath. Worsening LBP, new MRI L-spine with L5/S1 listhesis 7mm, had fusion with Dr. Umaña with post-op pain, muscle spasms, controlled by Tizanidine.  Back Pain  Associated symptoms include abdominal pain and numbness. Pertinent negatives include no bladder incontinence, chest pain, fever or weakness.   Abdominal Pain  Associated symptoms include constipation, diarrhea, nausea and vomiting. Pertinent negatives include no arthralgias, fever or myalgias.        The following portions of the patient's history were reviewed and updated as appropriate: allergies, current medications, past family history, past medical history, past social history, past surgical history and problem list.    Review of Systems   Constitutional:  Positive for fatigue. Negative for chills and fever.   HENT:  Positive for trouble swallowing. Negative for hearing loss.    Eyes:  Negative for visual disturbance.   Respiratory:  Positive for shortness of breath.    Cardiovascular:  Negative for chest pain.   Gastrointestinal:  Positive for abdominal pain, constipation, diarrhea, nausea and vomiting.   Genitourinary:  Negative for urinary incontinence.  "  Musculoskeletal:  Positive for back pain. Negative for arthralgias, joint swelling, myalgias and neck pain.   Neurological:  Positive for numbness and headache. Negative for dizziness and weakness.       Objective   Physical Exam   Constitutional: She is oriented to person, place, and time. She appears well-developed and well-nourished.   HENT:   Head: Normocephalic and atraumatic.   Eyes: Pupils are equal, round, and reactive to light.   Cardiovascular: Normal rate, regular rhythm and normal heart sounds.   Pulmonary/Chest: Breath sounds normal.   Abdominal: Soft. Bowel sounds are normal. She exhibits no distension. There is no abdominal tenderness.   Neurological: She is alert and oriented to person, place, and time. She has normal reflexes. She displays normal reflexes. No sensory deficit.   Psychiatric: Her behavior is normal. Thought content normal.         Assessment & Plan   Diagnoses and all orders for this visit:    1. Neck pain (Primary)    2. Rectal pain    3. Abdominal pain, unspecified abdominal location    4. Chronic bilateral low back pain without sciatica    5. Chronic pain disorder    6. Other long term (current) drug therapy        INspect reviewed, in order. Low risk. UDS 3/28/24 in order. \"Megan Ernie\" on Inspect.   Failed Norco 5/325mg QID prn, 7.5/325mg QID prn. Increased to Norco 10/325mg q4h prn, hopefully reduce in future, discussed need to balance pain control with adverse side effects of opioids previously. Using short-acting opioids as pain varies considerably. Was taking brand name only following severe allergic reaction after pharmacy changed generics, but using [facility] now. Stopped when she began Klonopin. Began Butrans 5mcg/hr q7d to minimize risk of respiratory depression, PCP still stopped Ambien and Klonopin, she stopped Butrans, she wanted to stop opioids, cannot, restarted Butrans 5mcg/hr q7d, increased to 10mcg/hr q7d. Filled 11/11/24.  Patient's symptoms " are still adequately managed by current medication regimen, is doing well at this strength and dosage, therefore I will continue to prescribe unchanged as the most appropriate course of treatment.  Cont other meds as prescribed.  Began Tizanidine 4mg TID prn, working well, increased to 6mg TID prn, failed Baclofen, cannot tolerate Flexeril.  RTC in 3 months for f/u.

## 2025-02-27 ENCOUNTER — OFFICE VISIT (OUTPATIENT)
Dept: PAIN MEDICINE | Facility: CLINIC | Age: 50
End: 2025-02-27
Payer: MEDICARE

## 2025-02-27 VITALS
BODY MASS INDEX: 32.65 KG/M2 | HEART RATE: 65 BPM | RESPIRATION RATE: 16 BRPM | DIASTOLIC BLOOD PRESSURE: 85 MMHG | OXYGEN SATURATION: 99 % | WEIGHT: 178.5 LBS | SYSTOLIC BLOOD PRESSURE: 127 MMHG

## 2025-02-27 DIAGNOSIS — M54.50 CHRONIC BILATERAL LOW BACK PAIN WITHOUT SCIATICA: ICD-10-CM

## 2025-02-27 DIAGNOSIS — G89.4 CHRONIC PAIN DISORDER: ICD-10-CM

## 2025-02-27 DIAGNOSIS — K62.89 RECTAL PAIN: ICD-10-CM

## 2025-02-27 DIAGNOSIS — M54.2 NECK PAIN: ICD-10-CM

## 2025-02-27 DIAGNOSIS — R10.9 ABDOMINAL PAIN, UNSPECIFIED ABDOMINAL LOCATION: Primary | ICD-10-CM

## 2025-02-27 DIAGNOSIS — G89.29 CHRONIC BILATERAL LOW BACK PAIN WITHOUT SCIATICA: ICD-10-CM

## 2025-02-27 DIAGNOSIS — Z79.899 OTHER LONG TERM (CURRENT) DRUG THERAPY: ICD-10-CM

## 2025-02-27 RX ORDER — BUPRENORPHINE 10 UG/H
1 PATCH TRANSDERMAL
Qty: 4 PATCH | Refills: 2 | Status: SHIPPED | OUTPATIENT
Start: 2025-02-27

## 2025-02-27 NOTE — PROGRESS NOTES
Subjective   Megan Stafford is a 49 y.o. female.     History of Present Illness  CC: abdominal pain    lower abdominal pain, rectal pain, began 2012 with dx of Ulcerative Colitis, treated by me since 10/1/18, 9/10 at worst, 7/10 at best, always present, varies with UC, helped only by meds, was taking Tramadol with modest benefit, no NSAIDs with UC, also takes Humira and other UC meds. X-ray pelvis w/o acute injury. CT head with sinusitis only. Prior notes reviewed, as above, referred for pain management, wants to take minimum pain meds possible. Started Norco 5/325mg QID prn, not effective, increased to 7.5/325mg QID prn, then 10/325mg QID prn with better relief, generally doing well now except during flares. Had bladder insterstim implanted with improvement in incontinence, no change to pain, stable still. Syncopal episodes, had cardiac cath. Worsening LBP, new MRI L-spine with L5/S1 listhesis 7mm, had fusion with Dr. Umaña with post-op pain, muscle spasms, controlled by Tizanidine.  Neck Pain   Associated symptoms include numbness and trouble swallowing. Pertinent negatives include no chest pain, fever or weakness.   Back Pain  Associated symptoms include abdominal pain and numbness. Pertinent negatives include no bladder incontinence, chest pain, fever or weakness.   Abdominal Pain  Associated symptoms include constipation, diarrhea, nausea and vomiting. Pertinent negatives include no arthralgias, fever or myalgias.        The following portions of the patient's history were reviewed and updated as appropriate: allergies, current medications, past family history, past medical history, past social history, past surgical history and problem list.    Review of Systems   Constitutional:  Positive for fatigue. Negative for chills and fever.   HENT:  Positive for trouble swallowing. Negative for hearing loss.    Eyes:  Negative for visual disturbance.   Respiratory:  Positive for shortness of breath.   "  Cardiovascular:  Negative for chest pain.   Gastrointestinal:  Positive for abdominal pain, constipation, diarrhea, nausea and vomiting.   Genitourinary:  Negative for urinary incontinence.   Musculoskeletal:  Positive for back pain and neck pain. Negative for arthralgias, joint swelling and myalgias.   Neurological:  Positive for numbness and headache. Negative for dizziness and weakness.       Objective   Physical Exam   Constitutional: She is oriented to person, place, and time. She appears well-developed and well-nourished.   HENT:   Head: Normocephalic and atraumatic.   Eyes: Pupils are equal, round, and reactive to light.   Cardiovascular: Normal rate, regular rhythm and normal heart sounds.   Pulmonary/Chest: Breath sounds normal.   Abdominal: Soft. Bowel sounds are normal. She exhibits no distension. There is no abdominal tenderness.   Neurological: She is alert and oriented to person, place, and time. She has normal reflexes. She displays normal reflexes. No sensory deficit.   Psychiatric: Her behavior is normal. Thought content normal.         Assessment & Plan   Diagnoses and all orders for this visit:    1. Abdominal pain, unspecified abdominal location (Primary)    2. Chronic bilateral low back pain without sciatica    3. Chronic pain disorder    4. Neck pain    5. Other long term (current) drug therapy    6. Rectal pain        INspect reviewed, in order. Low risk. UDS 6/20/24 in order. \"Megan Ernie\" on Inspect.   Failed Norco 5/325mg QID prn, 7.5/325mg QID prn. Increased to Norco 10/325mg q4h prn, hopefully reduce in future, discussed need to balance pain control with adverse side effects of opioids previously. Using short-acting opioids as pain varies considerably. Was taking brand name only following severe allergic reaction after pharmacy changed generics, but using [facility] now. Stopped when she began Klonopin. Began Butrans 5mcg/hr q7d to minimize risk of respiratory " depression, PCP still stopped Ambien and Klonopin, she stopped Butrans, she wanted to stop opioids, cannot, restarted Butrans 5mcg/hr q7d, increased to 10mcg/hr q7d. Filled 2/5/25.  Patient's symptoms are still adequately managed by current medication regimen, is doing well at this strength and dosage, therefore I will continue to prescribe unchanged as the most appropriate course of treatment.  Cont other meds as prescribed.  Began Tizanidine 4mg TID prn, working well, increased to 6mg TID prn, failed Baclofen, cannot tolerate Flexeril.  RTC in 3 months for f/u.

## 2025-05-29 ENCOUNTER — OFFICE VISIT (OUTPATIENT)
Dept: PAIN MEDICINE | Facility: CLINIC | Age: 50
End: 2025-05-29
Payer: MEDICARE

## 2025-05-29 VITALS
WEIGHT: 184.2 LBS | BODY MASS INDEX: 33.69 KG/M2 | RESPIRATION RATE: 16 BRPM | DIASTOLIC BLOOD PRESSURE: 76 MMHG | SYSTOLIC BLOOD PRESSURE: 107 MMHG | OXYGEN SATURATION: 96 % | HEART RATE: 80 BPM

## 2025-05-29 DIAGNOSIS — R10.9 ABDOMINAL PAIN, UNSPECIFIED ABDOMINAL LOCATION: Primary | ICD-10-CM

## 2025-05-29 DIAGNOSIS — Z79.899 HIGH RISK MEDICATION USE: Primary | ICD-10-CM

## 2025-05-29 DIAGNOSIS — G89.4 CHRONIC PAIN DISORDER: ICD-10-CM

## 2025-05-29 DIAGNOSIS — K62.89 RECTAL PAIN: ICD-10-CM

## 2025-05-29 DIAGNOSIS — M54.50 CHRONIC BILATERAL LOW BACK PAIN WITHOUT SCIATICA: ICD-10-CM

## 2025-05-29 DIAGNOSIS — M54.2 NECK PAIN: ICD-10-CM

## 2025-05-29 DIAGNOSIS — G89.29 CHRONIC BILATERAL LOW BACK PAIN WITHOUT SCIATICA: ICD-10-CM

## 2025-05-29 DIAGNOSIS — Z79.899 OTHER LONG TERM (CURRENT) DRUG THERAPY: ICD-10-CM

## 2025-05-29 RX ORDER — BUPRENORPHINE 10 UG/H
1 PATCH TRANSDERMAL
Qty: 4 PATCH | Refills: 2 | Status: SHIPPED | OUTPATIENT
Start: 2025-05-29

## 2025-05-29 RX ORDER — PREGABALIN 25 MG/1
25 CAPSULE ORAL 2 TIMES DAILY
Qty: 60 CAPSULE | Refills: 2 | Status: SHIPPED | OUTPATIENT
Start: 2025-05-29

## 2025-05-29 NOTE — PROGRESS NOTES
Subjective   Megan Stafford is a 50 y.o. female.     History of Present Illness  CC: abdominal pain    lower abdominal pain, rectal pain, also LBP, began 2012 with dx of Ulcerative Colitis, treated by me since 10/1/18, 9/10 at worst, 7/10 at best, always present, varies with UC, helped only by meds, was taking Tramadol with modest benefit, no NSAIDs with UC, also takes Humira and other UC meds. X-ray pelvis w/o acute injury. CT head with sinusitis only. Prior notes reviewed, as above, referred for pain management, wants to take minimum pain meds possible. Started Norco 5/325mg QID prn, not effective, increased to 7.5/325mg QID prn, then 10/325mg QID prn with better relief, rotated to Butrans 10mcg/hr q7d, doing well still. Had bladder insterstim implanted with improvement in incontinence, no change to pain, stable still. Syncopal episodes, had cardiac cath. Had worsening LBP, new MRI L-spine with L5/S1 listhesis 7mm, had fusion with Dr. Umaña with post-op pain, muscle spasms, controlled by Tizanidine.  Back Pain  Associated symptoms: abdominal pain and numbness    Associated symptoms: no bladder incontinence, no chest pain, no fever and no weakness    Neck Pain   Associated symptoms include numbness and trouble swallowing. Pertinent negatives include no chest pain, fever or weakness.   Abdominal Pain  Associated symptoms include constipation, diarrhea, nausea and vomiting. Pertinent negatives include no arthralgias, fever or myalgias.        The following portions of the patient's history were reviewed and updated as appropriate: allergies, current medications, past family history, past medical history, past social history, past surgical history and problem list.    Review of Systems   Constitutional:  Positive for fatigue. Negative for chills and fever.   HENT:  Positive for trouble swallowing. Negative for hearing loss.    Eyes:  Negative for visual disturbance.   Respiratory:  Positive for shortness of breath.   "  Cardiovascular:  Negative for chest pain.   Gastrointestinal:  Positive for abdominal pain, constipation, diarrhea, nausea and vomiting.   Genitourinary:  Negative for urinary incontinence.   Musculoskeletal:  Positive for back pain and neck pain. Negative for arthralgias, joint swelling and myalgias.   Neurological:  Positive for numbness and headache. Negative for dizziness and weakness.       Objective   Physical Exam   Constitutional: She is oriented to person, place, and time. She appears well-developed and well-nourished.   HENT:   Head: Normocephalic and atraumatic.   Eyes: Pupils are equal, round, and reactive to light.   Cardiovascular: Normal rate, regular rhythm and normal heart sounds.   Pulmonary/Chest: Breath sounds normal.   Abdominal: Soft. Bowel sounds are normal. She exhibits no distension. There is no abdominal tenderness.   Neurological: She is alert and oriented to person, place, and time. She has normal reflexes. She displays normal reflexes. No sensory deficit.   Psychiatric: Her behavior is normal. Thought content normal.         Assessment & Plan   Diagnoses and all orders for this visit:    1. Chronic bilateral low back pain without sciatica (Primary)    2. Neck pain    3. Abdominal pain, unspecified abdominal location    4. Chronic pain disorder    5. Other long term (current) drug therapy    6. Rectal pain        INspect reviewed, in order. Low risk. UDS 6/20/24 in order. \"Megan Ernie\" on Inspect.   Failed Norco 5/325mg QID prn, 7.5/325mg QID prn. Increased to Norco 10/325mg q4h prn, hopefully reduce in future, discussed need to balance pain control with adverse side effects of opioids previously. Using short-acting opioids as pain varies considerably. Was taking brand name only following severe allergic reaction after pharmacy changed generics, but using [facility] now. Stopped when she began Klonopin. Began Butrans 5mcg/hr q7d to minimize risk of respiratory " depression, PCP still stopped Ambien and Klonopin, she stopped Butrans, she wanted to stop opioids, cannot, restarted Butrans 5mcg/hr q7d, increased to 10mcg/hr q7d. Filled 4/23/25.  Patient's symptoms are still adequately managed by current medication regimen, is doing well at this strength and dosage, therefore I will continue to prescribe unchanged as the most appropriate course of treatment.  Cont other meds as prescribed.  Began Tizanidine 4mg TID prn, working well, increased to 6mg TID prn, failed Baclofen, cannot tolerate Flexeril.  RTC in 3 months for f/u.

## 2025-08-28 ENCOUNTER — OFFICE VISIT (OUTPATIENT)
Dept: PAIN MEDICINE | Facility: CLINIC | Age: 50
End: 2025-08-28
Payer: MEDICARE

## 2025-08-28 VITALS
DIASTOLIC BLOOD PRESSURE: 81 MMHG | SYSTOLIC BLOOD PRESSURE: 120 MMHG | RESPIRATION RATE: 16 BRPM | WEIGHT: 192.9 LBS | OXYGEN SATURATION: 98 % | BODY MASS INDEX: 35.28 KG/M2 | HEART RATE: 89 BPM

## 2025-08-28 DIAGNOSIS — M54.2 NECK PAIN: ICD-10-CM

## 2025-08-28 DIAGNOSIS — R10.9 ABDOMINAL PAIN, UNSPECIFIED ABDOMINAL LOCATION: Primary | ICD-10-CM

## 2025-08-28 DIAGNOSIS — M54.50 CHRONIC BILATERAL LOW BACK PAIN WITHOUT SCIATICA: ICD-10-CM

## 2025-08-28 DIAGNOSIS — G89.4 CHRONIC PAIN DISORDER: ICD-10-CM

## 2025-08-28 DIAGNOSIS — K62.89 RECTAL PAIN: ICD-10-CM

## 2025-08-28 DIAGNOSIS — G89.29 CHRONIC BILATERAL LOW BACK PAIN WITHOUT SCIATICA: ICD-10-CM

## 2025-08-28 DIAGNOSIS — Z79.899 OTHER LONG TERM (CURRENT) DRUG THERAPY: ICD-10-CM

## 2025-08-28 RX ORDER — DOXYCYCLINE HYCLATE 50 MG/1
50 CAPSULE ORAL
COMMUNITY
Start: 2025-06-13

## 2025-08-28 RX ORDER — BUPRENORPHINE 10 UG/H
1 PATCH TRANSDERMAL
Qty: 4 PATCH | Refills: 2 | Status: SHIPPED | OUTPATIENT
Start: 2025-08-28

## (undated) DEVICE — DUAL LUMEN URETERAL CATHETER

## (undated) DEVICE — NITINOL STONE RETRIEVAL BASKET: Brand: ZERO TIP

## (undated) DEVICE — NITINOL WIRE WITH HYDROPHILIC TIP: Brand: SENSOR

## (undated) DEVICE — KT SURG TURNOVER 050

## (undated) DEVICE — GLV SURG SIGNATURE ESSENTIAL PF LTX SZ7

## (undated) DEVICE — PK CYSTO 50

## (undated) DEVICE — TUBING, SUCTION, 1/4" X 12', STRAIGHT: Brand: MEDLINE

## (undated) DEVICE — SOLUTION,WATER,IRRIGATION,1000ML,STERILE: Brand: MEDLINE

## (undated) DEVICE — SYR LUERLOK 30CC

## (undated) DEVICE — PREP SPRY PVPI 10P 2OZ

## (undated) DEVICE — SOL IRRG H2O BG 3000ML STRL